# Patient Record
Sex: FEMALE | Race: WHITE | NOT HISPANIC OR LATINO
[De-identification: names, ages, dates, MRNs, and addresses within clinical notes are randomized per-mention and may not be internally consistent; named-entity substitution may affect disease eponyms.]

---

## 2017-02-07 ENCOUNTER — RESULT REVIEW (OUTPATIENT)
Age: 62
End: 2017-02-07

## 2017-05-19 ENCOUNTER — OUTPATIENT (OUTPATIENT)
Dept: OUTPATIENT SERVICES | Facility: HOSPITAL | Age: 62
LOS: 1 days | End: 2017-05-19
Payer: COMMERCIAL

## 2017-05-19 ENCOUNTER — APPOINTMENT (OUTPATIENT)
Dept: MAMMOGRAPHY | Facility: IMAGING CENTER | Age: 62
End: 2017-05-19

## 2017-05-19 ENCOUNTER — APPOINTMENT (OUTPATIENT)
Dept: ULTRASOUND IMAGING | Facility: IMAGING CENTER | Age: 62
End: 2017-05-19

## 2017-05-19 DIAGNOSIS — Z00.8 ENCOUNTER FOR OTHER GENERAL EXAMINATION: ICD-10-CM

## 2017-05-19 PROCEDURE — 76641 ULTRASOUND BREAST COMPLETE: CPT

## 2017-05-19 PROCEDURE — 77063 BREAST TOMOSYNTHESIS BI: CPT

## 2017-05-19 PROCEDURE — 77067 SCR MAMMO BI INCL CAD: CPT

## 2017-08-03 ENCOUNTER — OUTPATIENT (OUTPATIENT)
Dept: OUTPATIENT SERVICES | Facility: HOSPITAL | Age: 62
LOS: 1 days | End: 2017-08-03
Payer: COMMERCIAL

## 2017-08-03 ENCOUNTER — APPOINTMENT (OUTPATIENT)
Age: 62
End: 2017-08-03
Payer: COMMERCIAL

## 2017-08-03 DIAGNOSIS — Z00.8 ENCOUNTER FOR OTHER GENERAL EXAMINATION: ICD-10-CM

## 2017-08-03 PROCEDURE — 70486 CT MAXILLOFACIAL W/O DYE: CPT | Mod: 26

## 2017-08-03 PROCEDURE — 70486 CT MAXILLOFACIAL W/O DYE: CPT

## 2018-02-14 ENCOUNTER — RESULT REVIEW (OUTPATIENT)
Age: 63
End: 2018-02-14

## 2018-06-06 ENCOUNTER — APPOINTMENT (OUTPATIENT)
Dept: RADIOLOGY | Facility: IMAGING CENTER | Age: 63
End: 2018-06-06
Payer: COMMERCIAL

## 2018-06-06 ENCOUNTER — APPOINTMENT (OUTPATIENT)
Dept: MAMMOGRAPHY | Facility: IMAGING CENTER | Age: 63
End: 2018-06-06
Payer: COMMERCIAL

## 2018-06-06 ENCOUNTER — APPOINTMENT (OUTPATIENT)
Dept: ULTRASOUND IMAGING | Facility: IMAGING CENTER | Age: 63
End: 2018-06-06
Payer: COMMERCIAL

## 2018-06-06 ENCOUNTER — OUTPATIENT (OUTPATIENT)
Dept: OUTPATIENT SERVICES | Facility: HOSPITAL | Age: 63
LOS: 1 days | End: 2018-06-06
Payer: COMMERCIAL

## 2018-06-06 DIAGNOSIS — Z00.8 ENCOUNTER FOR OTHER GENERAL EXAMINATION: ICD-10-CM

## 2018-06-06 PROCEDURE — 77063 BREAST TOMOSYNTHESIS BI: CPT

## 2018-06-06 PROCEDURE — 77067 SCR MAMMO BI INCL CAD: CPT | Mod: 26

## 2018-06-06 PROCEDURE — 77063 BREAST TOMOSYNTHESIS BI: CPT | Mod: 26

## 2018-06-06 PROCEDURE — 77067 SCR MAMMO BI INCL CAD: CPT

## 2018-06-06 PROCEDURE — 77080 DXA BONE DENSITY AXIAL: CPT | Mod: 26

## 2018-06-06 PROCEDURE — 76641 ULTRASOUND BREAST COMPLETE: CPT | Mod: 26,50

## 2018-06-06 PROCEDURE — 76641 ULTRASOUND BREAST COMPLETE: CPT

## 2018-06-06 PROCEDURE — 77080 DXA BONE DENSITY AXIAL: CPT

## 2019-02-14 ENCOUNTER — APPOINTMENT (OUTPATIENT)
Dept: OBGYN | Facility: CLINIC | Age: 64
End: 2019-02-14

## 2019-03-18 ENCOUNTER — APPOINTMENT (OUTPATIENT)
Dept: OBGYN | Facility: CLINIC | Age: 64
End: 2019-03-18
Payer: MEDICARE

## 2019-03-18 PROCEDURE — G0101: CPT

## 2019-09-06 ENCOUNTER — OUTPATIENT (OUTPATIENT)
Dept: OUTPATIENT SERVICES | Facility: HOSPITAL | Age: 64
LOS: 1 days | End: 2019-09-06
Payer: MEDICARE

## 2019-09-06 ENCOUNTER — APPOINTMENT (OUTPATIENT)
Dept: ULTRASOUND IMAGING | Facility: IMAGING CENTER | Age: 64
End: 2019-09-06
Payer: MEDICARE

## 2019-09-06 ENCOUNTER — APPOINTMENT (OUTPATIENT)
Dept: MAMMOGRAPHY | Facility: IMAGING CENTER | Age: 64
End: 2019-09-06
Payer: MEDICARE

## 2019-09-06 DIAGNOSIS — Z00.8 ENCOUNTER FOR OTHER GENERAL EXAMINATION: ICD-10-CM

## 2019-09-06 PROCEDURE — 76641 ULTRASOUND BREAST COMPLETE: CPT | Mod: 26,50

## 2019-09-06 PROCEDURE — 77063 BREAST TOMOSYNTHESIS BI: CPT

## 2019-09-06 PROCEDURE — 77063 BREAST TOMOSYNTHESIS BI: CPT | Mod: 26

## 2019-09-06 PROCEDURE — 77067 SCR MAMMO BI INCL CAD: CPT

## 2019-09-06 PROCEDURE — 77067 SCR MAMMO BI INCL CAD: CPT | Mod: 26

## 2019-09-06 PROCEDURE — 76641 ULTRASOUND BREAST COMPLETE: CPT

## 2020-11-24 ENCOUNTER — TRANSCRIPTION ENCOUNTER (OUTPATIENT)
Age: 65
End: 2020-11-24

## 2020-12-22 ENCOUNTER — TRANSCRIPTION ENCOUNTER (OUTPATIENT)
Age: 65
End: 2020-12-22

## 2021-01-04 ENCOUNTER — TRANSCRIPTION ENCOUNTER (OUTPATIENT)
Age: 66
End: 2021-01-04

## 2021-04-12 ENCOUNTER — RESULT REVIEW (OUTPATIENT)
Age: 66
End: 2021-04-12

## 2021-04-12 ENCOUNTER — FORM ENCOUNTER (OUTPATIENT)
Age: 66
End: 2021-04-12

## 2021-04-13 ENCOUNTER — FORM ENCOUNTER (OUTPATIENT)
Age: 66
End: 2021-04-13

## 2021-04-13 ENCOUNTER — APPOINTMENT (OUTPATIENT)
Dept: OBGYN | Facility: CLINIC | Age: 66
End: 2021-04-13
Payer: MEDICARE

## 2021-04-13 PROCEDURE — G0101: CPT

## 2021-04-15 ENCOUNTER — FORM ENCOUNTER (OUTPATIENT)
Age: 66
End: 2021-04-15

## 2021-04-29 ENCOUNTER — TRANSCRIPTION ENCOUNTER (OUTPATIENT)
Age: 66
End: 2021-04-29

## 2021-04-30 ENCOUNTER — FORM ENCOUNTER (OUTPATIENT)
Age: 66
End: 2021-04-30

## 2021-05-10 ENCOUNTER — FORM ENCOUNTER (OUTPATIENT)
Age: 66
End: 2021-05-10

## 2021-05-11 ENCOUNTER — RESULT REVIEW (OUTPATIENT)
Age: 66
End: 2021-05-11

## 2021-05-11 ENCOUNTER — OUTPATIENT (OUTPATIENT)
Dept: OUTPATIENT SERVICES | Facility: HOSPITAL | Age: 66
LOS: 1 days | End: 2021-05-11
Payer: MEDICARE

## 2021-05-11 ENCOUNTER — APPOINTMENT (OUTPATIENT)
Dept: RADIOLOGY | Facility: IMAGING CENTER | Age: 66
End: 2021-05-11
Payer: MEDICARE

## 2021-05-11 ENCOUNTER — FORM ENCOUNTER (OUTPATIENT)
Age: 66
End: 2021-05-11

## 2021-05-11 ENCOUNTER — APPOINTMENT (OUTPATIENT)
Dept: ULTRASOUND IMAGING | Facility: IMAGING CENTER | Age: 66
End: 2021-05-11
Payer: MEDICARE

## 2021-05-11 ENCOUNTER — APPOINTMENT (OUTPATIENT)
Dept: MAMMOGRAPHY | Facility: IMAGING CENTER | Age: 66
End: 2021-05-11
Payer: MEDICARE

## 2021-05-11 DIAGNOSIS — Z00.8 ENCOUNTER FOR OTHER GENERAL EXAMINATION: ICD-10-CM

## 2021-05-11 PROCEDURE — 77080 DXA BONE DENSITY AXIAL: CPT | Mod: 26

## 2021-05-11 PROCEDURE — 77063 BREAST TOMOSYNTHESIS BI: CPT | Mod: 26

## 2021-05-11 PROCEDURE — 76641 ULTRASOUND BREAST COMPLETE: CPT | Mod: 26,50

## 2021-05-11 PROCEDURE — 77067 SCR MAMMO BI INCL CAD: CPT | Mod: 26

## 2021-05-11 PROCEDURE — 76641 ULTRASOUND BREAST COMPLETE: CPT

## 2021-05-11 PROCEDURE — 77067 SCR MAMMO BI INCL CAD: CPT

## 2021-05-11 PROCEDURE — 77063 BREAST TOMOSYNTHESIS BI: CPT

## 2021-05-11 PROCEDURE — 77080 DXA BONE DENSITY AXIAL: CPT

## 2021-08-08 ENCOUNTER — TRANSCRIPTION ENCOUNTER (OUTPATIENT)
Age: 66
End: 2021-08-08

## 2021-08-10 ENCOUNTER — TRANSCRIPTION ENCOUNTER (OUTPATIENT)
Age: 66
End: 2021-08-10

## 2021-10-13 ENCOUNTER — FORM ENCOUNTER (OUTPATIENT)
Age: 66
End: 2021-10-13

## 2021-10-16 ENCOUNTER — FORM ENCOUNTER (OUTPATIENT)
Age: 66
End: 2021-10-16

## 2021-11-01 ENCOUNTER — TRANSCRIPTION ENCOUNTER (OUTPATIENT)
Age: 66
End: 2021-11-01

## 2021-12-01 ENCOUNTER — TRANSCRIPTION ENCOUNTER (OUTPATIENT)
Age: 66
End: 2021-12-01

## 2021-12-30 ENCOUNTER — TRANSCRIPTION ENCOUNTER (OUTPATIENT)
Age: 66
End: 2021-12-30

## 2021-12-31 ENCOUNTER — TRANSCRIPTION ENCOUNTER (OUTPATIENT)
Age: 66
End: 2021-12-31

## 2022-04-20 ENCOUNTER — RX RENEWAL (OUTPATIENT)
Age: 67
End: 2022-04-20

## 2022-04-20 DIAGNOSIS — Z79.890 HORMONE REPLACEMENT THERAPY: ICD-10-CM

## 2022-04-20 DIAGNOSIS — B37.9 CANDIDIASIS, UNSPECIFIED: ICD-10-CM

## 2022-04-22 ENCOUNTER — NON-APPOINTMENT (OUTPATIENT)
Age: 67
End: 2022-04-22

## 2022-04-22 DIAGNOSIS — E78.00 PURE HYPERCHOLESTEROLEMIA, UNSPECIFIED: ICD-10-CM

## 2022-04-22 DIAGNOSIS — Z98.890 OTHER SPECIFIED POSTPROCEDURAL STATES: ICD-10-CM

## 2022-04-22 DIAGNOSIS — Z83.3 FAMILY HISTORY OF DIABETES MELLITUS: ICD-10-CM

## 2022-04-22 DIAGNOSIS — Z78.9 OTHER SPECIFIED HEALTH STATUS: ICD-10-CM

## 2022-04-22 RX ORDER — ASCORBIC ACID 500 MG
TABLET ORAL
Refills: 0 | Status: ACTIVE | COMMUNITY

## 2022-05-13 ENCOUNTER — APPOINTMENT (OUTPATIENT)
Dept: OBGYN | Facility: CLINIC | Age: 67
End: 2022-05-13

## 2022-05-20 ENCOUNTER — NON-APPOINTMENT (OUTPATIENT)
Age: 67
End: 2022-05-20

## 2022-06-14 ENCOUNTER — RX RENEWAL (OUTPATIENT)
Age: 67
End: 2022-06-14

## 2022-06-16 ENCOUNTER — RX RENEWAL (OUTPATIENT)
Age: 67
End: 2022-06-16

## 2022-11-22 ENCOUNTER — RX RENEWAL (OUTPATIENT)
Age: 67
End: 2022-11-22

## 2022-11-23 ENCOUNTER — RX RENEWAL (OUTPATIENT)
Age: 67
End: 2022-11-23

## 2023-01-23 ENCOUNTER — OFFICE (OUTPATIENT)
Dept: URBAN - METROPOLITAN AREA CLINIC 12 | Facility: CLINIC | Age: 68
Setting detail: OPHTHALMOLOGY
End: 2023-01-23
Payer: MEDICARE

## 2023-01-23 DIAGNOSIS — H01.001: ICD-10-CM

## 2023-01-23 DIAGNOSIS — H43.393: ICD-10-CM

## 2023-01-23 DIAGNOSIS — H16.223: ICD-10-CM

## 2023-01-23 DIAGNOSIS — H01.004: ICD-10-CM

## 2023-01-23 DIAGNOSIS — Z96.1: ICD-10-CM

## 2023-01-23 DIAGNOSIS — H35.413: ICD-10-CM

## 2023-01-23 PROBLEM — H35.40 PERIPAPILLARY ATROPHY: Status: ACTIVE | Noted: 2023-01-23

## 2023-01-23 PROCEDURE — 92014 COMPRE OPH EXAM EST PT 1/>: CPT | Performed by: OPHTHALMOLOGY

## 2023-01-23 PROCEDURE — 92134 CPTRZ OPH DX IMG PST SGM RTA: CPT | Performed by: OPHTHALMOLOGY

## 2023-01-23 ASSESSMENT — KERATOMETRY
OD_K2POWER_DIOPTERS: 43.25
OD_AXISANGLE_DEGREES: 156
OS_K2POWER_DIOPTERS: 43.00
OD_K1POWER_DIOPTERS: 42.50
OS_K1POWER_DIOPTERS: 42.00
OS_AXISANGLE_DEGREES: 178

## 2023-01-23 ASSESSMENT — REFRACTION_CURRENTRX
OD_OVR_VA: 20/
OD_VPRISM_DIRECTION: SV
OS_VPRISM_DIRECTION: SV
OS_OVR_VA: 20/
OD_SPHERE: +2.00
OS_SPHERE: +2.00

## 2023-01-23 ASSESSMENT — AXIALLENGTH_DERIVED
OS_AL: 24.1677
OD_AL: 24.1255
OS_AL: 24.1677
OD_AL: 24.1255

## 2023-01-23 ASSESSMENT — SUPERFICIAL PUNCTATE KERATITIS (SPK)
OS_SPK: 1+
OD_SPK: 1+

## 2023-01-23 ASSESSMENT — REFRACTION_MANIFEST
OS_SPHERE: +0.25
OS_VA1: 20/20-
OD_CYLINDER: -1.00
OS_CYLINDER: -1.50
OU_VA: 20/25
OD_SPHERE: -0.25
OD_AXIS: 080
OD_VA1: 20/20-2
OS_AXIS: 086

## 2023-01-23 ASSESSMENT — VISUAL ACUITY
OD_BCVA: 20/25-2
OS_BCVA: 20/60

## 2023-01-23 ASSESSMENT — REFRACTION_AUTOREFRACTION
OD_CYLINDER: -1.00
OD_AXIS: 080
OD_SPHERE: -0.25
OS_CYLINDER: -1.50
OS_AXIS: 086
OS_SPHERE: +0.25

## 2023-01-23 ASSESSMENT — SPHEQUIV_DERIVED
OD_SPHEQUIV: -0.75
OS_SPHEQUIV: -0.5
OD_SPHEQUIV: -0.75
OS_SPHEQUIV: -0.5

## 2023-01-23 ASSESSMENT — LID EXAM ASSESSMENTS
OD_BLEPHARITIS: RLL 1+
OS_BLEPHARITIS: LLL 1+

## 2023-01-23 ASSESSMENT — CONFRONTATIONAL VISUAL FIELD TEST (CVF)
OD_FINDINGS: FULL
OS_FINDINGS: FULL

## 2023-01-23 ASSESSMENT — TONOMETRY
OD_IOP_MMHG: 18
OS_IOP_MMHG: 17

## 2023-04-16 ENCOUNTER — NON-APPOINTMENT (OUTPATIENT)
Age: 68
End: 2023-04-16

## 2023-04-21 ENCOUNTER — APPOINTMENT (OUTPATIENT)
Dept: OBGYN | Facility: CLINIC | Age: 68
End: 2023-04-21
Payer: MEDICARE

## 2023-04-21 VITALS
HEIGHT: 66 IN | DIASTOLIC BLOOD PRESSURE: 83 MMHG | SYSTOLIC BLOOD PRESSURE: 123 MMHG | BODY MASS INDEX: 23.14 KG/M2 | WEIGHT: 144 LBS

## 2023-04-21 DIAGNOSIS — Z01.419 ENCOUNTER FOR GYNECOLOGICAL EXAMINATION (GENERAL) (ROUTINE) W/OUT ABNORMAL FINDINGS: ICD-10-CM

## 2023-04-21 PROCEDURE — G0101: CPT

## 2023-04-21 RX ORDER — NITROFURANTOIN (MONOHYDRATE/MACROCRYSTALS) 25; 75 MG/1; MG/1
100 CAPSULE ORAL
Qty: 20 | Refills: 0 | Status: ACTIVE | COMMUNITY
Start: 2022-11-22 | End: 1900-01-01

## 2023-04-21 RX ORDER — FLUCONAZOLE 150 MG/1
150 TABLET ORAL
Qty: 2 | Refills: 2 | Status: ACTIVE | COMMUNITY
Start: 2022-04-20 | End: 1900-01-01

## 2023-04-21 NOTE — PHYSICAL EXAM
[Alert] : alert [Appropriately responsive] : appropriately responsive [No Acute Distress] : no acute distress [No Lymphadenopathy] : no lymphadenopathy [Soft] : soft [Non-tender] : non-tender [Non-distended] : non-distended [No HSM] : No HSM [No Lesions] : no lesions [No Mass] : no mass [Oriented x3] : oriented x3 [Examination Of The Breasts] : a normal appearance [No Masses] : no breast masses were palpable [Labia Majora] : normal [Labia Minora] : normal [Normal] : normal [Uterine Adnexae] : normal

## 2023-04-21 NOTE — HISTORY OF PRESENT ILLNESS
[Patient reported mammogram was normal] : Patient reported mammogram was normal [Patient reported breast sonogram was normal] : Patient reported breast sonogram was normal [Patient reported PAP Smear was normal] : Patient reported PAP Smear was normal [Patient reported bone density results were normal] : Patient reported bone density results were normal [FreeTextEntry1] : 66 yo female pt  present for an annual wellness visit. Pt is still taking Premarin. The pt is well and has no complaints. \par \par PMHx: hypothyroid\par PSHx: transilluminated phlebectomy\par OBHx:  x4\par  [Mammogramdate] : 05/21 [TextBox_19] : Birads 1 [BreastSonogramDate] : 05/21 [PapSmeardate] : 04/21 [BoneDensityDate] : 05/21

## 2023-04-21 NOTE — PLAN
[FreeTextEntry1] : 68 yo female pt present for an annual wellness visit\par \par HCM\par -pap done today \par -rx for breast mammo/ sono\par -rx for bone density\par -rx Premarin, Diflucan, Macrobid\par -f/u PCP for annual and appropriate immunizations\par -rto 2 year

## 2023-04-24 DIAGNOSIS — R39.9 UNSPECIFIED SYMPTOMS AND SIGNS INVOLVING THE GENITOURINARY SYSTEM: ICD-10-CM

## 2023-04-24 LAB — HPV HIGH+LOW RISK DNA PNL CVX: NOT DETECTED

## 2023-04-24 RX ORDER — NITROFURANTOIN (MONOHYDRATE/MACROCRYSTALS) 25; 75 MG/1; MG/1
100 CAPSULE ORAL
Qty: 20 | Refills: 0 | Status: ACTIVE | COMMUNITY
Start: 2023-04-24 | End: 1900-01-01

## 2023-04-28 LAB — CYTOLOGY CVX/VAG DOC THIN PREP: NORMAL

## 2023-05-08 ENCOUNTER — RESULT REVIEW (OUTPATIENT)
Age: 68
End: 2023-05-08

## 2023-05-08 ENCOUNTER — APPOINTMENT (OUTPATIENT)
Dept: MAMMOGRAPHY | Facility: CLINIC | Age: 68
End: 2023-05-08
Payer: MEDICARE

## 2023-05-08 ENCOUNTER — APPOINTMENT (OUTPATIENT)
Dept: ULTRASOUND IMAGING | Facility: CLINIC | Age: 68
End: 2023-05-08
Payer: MEDICARE

## 2023-05-08 ENCOUNTER — APPOINTMENT (OUTPATIENT)
Dept: RADIOLOGY | Facility: CLINIC | Age: 68
End: 2023-05-08
Payer: MEDICARE

## 2023-05-08 ENCOUNTER — OUTPATIENT (OUTPATIENT)
Dept: OUTPATIENT SERVICES | Facility: HOSPITAL | Age: 68
LOS: 1 days | End: 2023-05-08
Payer: MEDICARE

## 2023-05-08 DIAGNOSIS — Z01.419 ENCOUNTER FOR GYNECOLOGICAL EXAMINATION (GENERAL) (ROUTINE) WITHOUT ABNORMAL FINDINGS: ICD-10-CM

## 2023-05-08 PROCEDURE — 76641 ULTRASOUND BREAST COMPLETE: CPT | Mod: 26,50,GZ

## 2023-05-08 PROCEDURE — 77067 SCR MAMMO BI INCL CAD: CPT | Mod: 26

## 2023-05-08 PROCEDURE — 76641 ULTRASOUND BREAST COMPLETE: CPT

## 2023-05-08 PROCEDURE — 77063 BREAST TOMOSYNTHESIS BI: CPT

## 2023-05-08 PROCEDURE — 77080 DXA BONE DENSITY AXIAL: CPT

## 2023-05-08 PROCEDURE — 77067 SCR MAMMO BI INCL CAD: CPT

## 2023-05-08 PROCEDURE — 77080 DXA BONE DENSITY AXIAL: CPT | Mod: 26

## 2023-05-08 PROCEDURE — 77063 BREAST TOMOSYNTHESIS BI: CPT | Mod: 26

## 2024-01-01 ENCOUNTER — NON-APPOINTMENT (OUTPATIENT)
Age: 69
End: 2024-01-01

## 2024-01-11 ENCOUNTER — NON-APPOINTMENT (OUTPATIENT)
Age: 69
End: 2024-01-11

## 2024-01-25 ENCOUNTER — NON-APPOINTMENT (OUTPATIENT)
Age: 69
End: 2024-01-25

## 2024-05-20 ENCOUNTER — RX RENEWAL (OUTPATIENT)
Age: 69
End: 2024-05-20

## 2024-05-23 ENCOUNTER — OFFICE (OUTPATIENT)
Dept: URBAN - METROPOLITAN AREA CLINIC 12 | Facility: CLINIC | Age: 69
Setting detail: OPHTHALMOLOGY
End: 2024-05-23
Payer: MEDICARE

## 2024-05-23 DIAGNOSIS — H35.40: ICD-10-CM

## 2024-05-23 DIAGNOSIS — H16.223: ICD-10-CM

## 2024-05-23 DIAGNOSIS — H43.393: ICD-10-CM

## 2024-05-23 DIAGNOSIS — H44.23: ICD-10-CM

## 2024-05-23 DIAGNOSIS — Z96.1: ICD-10-CM

## 2024-05-23 DIAGNOSIS — H01.001: ICD-10-CM

## 2024-05-23 DIAGNOSIS — H01.004: ICD-10-CM

## 2024-05-23 DIAGNOSIS — H35.413: ICD-10-CM

## 2024-05-23 PROCEDURE — 92250 FUNDUS PHOTOGRAPHY W/I&R: CPT | Performed by: OPHTHALMOLOGY

## 2024-05-23 PROCEDURE — 92014 COMPRE OPH EXAM EST PT 1/>: CPT | Performed by: OPHTHALMOLOGY

## 2024-05-23 ASSESSMENT — LID EXAM ASSESSMENTS
OD_BLEPHARITIS: RLL 1+
OS_BLEPHARITIS: LLL 1+

## 2024-05-23 ASSESSMENT — CONFRONTATIONAL VISUAL FIELD TEST (CVF)
OS_FINDINGS: FULL
OD_FINDINGS: FULL

## 2024-06-17 RX ORDER — CONJUGATED ESTROGENS 0.62 MG/G
0.62 CREAM VAGINAL
Qty: 1 | Refills: 1 | Status: ACTIVE | COMMUNITY
Start: 2022-04-20 | End: 1900-01-01

## 2024-06-18 ENCOUNTER — APPOINTMENT (OUTPATIENT)
Dept: OBGYN | Facility: CLINIC | Age: 69
End: 2024-06-18

## 2024-08-23 ENCOUNTER — INPATIENT (INPATIENT)
Facility: HOSPITAL | Age: 69
LOS: 2 days | Discharge: HOME | DRG: 387 | End: 2024-08-26
Attending: STUDENT IN AN ORGANIZED HEALTH CARE EDUCATION/TRAINING PROGRAM | Admitting: INTERNAL MEDICINE
Payer: MEDICARE

## 2024-08-23 VITALS
SYSTOLIC BLOOD PRESSURE: 116 MMHG | HEART RATE: 90 BPM | DIASTOLIC BLOOD PRESSURE: 62 MMHG | WEIGHT: 139.99 LBS | RESPIRATION RATE: 18 BRPM | TEMPERATURE: 101 F | HEIGHT: 66 IN | OXYGEN SATURATION: 97 %

## 2024-08-23 PROCEDURE — 93010 ELECTROCARDIOGRAM REPORT: CPT

## 2024-08-23 PROCEDURE — 99285 EMERGENCY DEPT VISIT HI MDM: CPT

## 2024-08-23 NOTE — ED ADULT TRIAGE NOTE - CHIEF COMPLAINT QUOTE
Pt presents from home complaining of N/V/D and fevers x3 days. Unable to tolerate PO. States she traveled to [facility] and returned on 8/20. Since return she has been feeling unwell. Pt appears pale and lethargic but remains awake and alert. Hx Hypotension and borderline DM

## 2024-08-24 DIAGNOSIS — K51.00 ULCERATIVE (CHRONIC) PANCOLITIS WITHOUT COMPLICATIONS: ICD-10-CM

## 2024-08-24 LAB
ABO RH CONFIRMATION: SIGNIFICANT CHANGE UP
ALBUMIN SERPL ELPH-MCNC: 3 G/DL — LOW (ref 3.3–5)
ALP SERPL-CCNC: 44 U/L — SIGNIFICANT CHANGE UP (ref 40–120)
ALT FLD-CCNC: 26 U/L — SIGNIFICANT CHANGE UP (ref 12–78)
ANION GAP SERPL CALC-SCNC: 8 MMOL/L — SIGNIFICANT CHANGE UP (ref 5–17)
APPEARANCE UR: ABNORMAL
APTT BLD: 26.3 SEC — SIGNIFICANT CHANGE UP (ref 24.5–35.6)
AST SERPL-CCNC: 22 U/L — SIGNIFICANT CHANGE UP (ref 15–37)
BACTERIA # UR AUTO: ABNORMAL /HPF
BASOPHILS # BLD AUTO: 0 K/UL — SIGNIFICANT CHANGE UP (ref 0–0.2)
BASOPHILS NFR BLD AUTO: 0 % — SIGNIFICANT CHANGE UP (ref 0–2)
BILIRUB SERPL-MCNC: 1.1 MG/DL — SIGNIFICANT CHANGE UP (ref 0.2–1.2)
BILIRUB UR-MCNC: NEGATIVE — SIGNIFICANT CHANGE UP
BLD GP AB SCN SERPL QL: SIGNIFICANT CHANGE UP
BUN SERPL-MCNC: 15 MG/DL — SIGNIFICANT CHANGE UP (ref 7–23)
C DIFF GDH STL QL: NEGATIVE — SIGNIFICANT CHANGE UP
C DIFF GDH STL QL: SIGNIFICANT CHANGE UP
CALCIUM SERPL-MCNC: 8.3 MG/DL — LOW (ref 8.5–10.1)
CAMPYLOBACTER DNA SPEC NAA+PROBE: DETECTED
CAST: 10 /LPF — HIGH (ref 0–4)
CHLORIDE SERPL-SCNC: 101 MMOL/L — SIGNIFICANT CHANGE UP (ref 96–108)
CO2 SERPL-SCNC: 23 MMOL/L — SIGNIFICANT CHANGE UP (ref 22–31)
COLOR SPEC: SIGNIFICANT CHANGE UP
CREAT SERPL-MCNC: 1.17 MG/DL — SIGNIFICANT CHANGE UP (ref 0.5–1.3)
DIFF PNL FLD: ABNORMAL
EGFR: 50 ML/MIN/1.73M2 — LOW
EOSINOPHIL # BLD AUTO: 0 K/UL — SIGNIFICANT CHANGE UP (ref 0–0.5)
EOSINOPHIL NFR BLD AUTO: 0 % — SIGNIFICANT CHANGE UP (ref 0–6)
FLUAV AG NPH QL: SIGNIFICANT CHANGE UP
FLUBV AG NPH QL: SIGNIFICANT CHANGE UP
GI PCR PANEL: DETECTED
GLUCOSE SERPL-MCNC: 175 MG/DL — HIGH (ref 70–99)
GLUCOSE UR QL: 250 MG/DL
GRAN CASTS # UR COMP ASSIST: PRESENT
HCT VFR BLD CALC: 37.8 % — SIGNIFICANT CHANGE UP (ref 34.5–45)
HGB BLD-MCNC: 13.1 G/DL — SIGNIFICANT CHANGE UP (ref 11.5–15.5)
INR BLD: 1.07 RATIO — SIGNIFICANT CHANGE UP (ref 0.85–1.18)
KETONES UR-MCNC: ABNORMAL MG/DL
LEUKOCYTE ESTERASE UR-ACNC: NEGATIVE — SIGNIFICANT CHANGE UP
LYMPHOCYTES # BLD AUTO: 0.41 K/UL — LOW (ref 1–3.3)
LYMPHOCYTES # BLD AUTO: 13 % — SIGNIFICANT CHANGE UP (ref 13–44)
MANUAL SMEAR VERIFICATION: SIGNIFICANT CHANGE UP
MCHC RBC-ENTMCNC: 32.8 PG — SIGNIFICANT CHANGE UP (ref 27–34)
MCHC RBC-ENTMCNC: 34.7 GM/DL — SIGNIFICANT CHANGE UP (ref 32–36)
MCV RBC AUTO: 94.7 FL — SIGNIFICANT CHANGE UP (ref 80–100)
MONOCYTES # BLD AUTO: 0.73 K/UL — SIGNIFICANT CHANGE UP (ref 0–0.9)
MONOCYTES NFR BLD AUTO: 23 % — HIGH (ref 2–14)
NEUTROPHILS # BLD AUTO: 2.04 K/UL — SIGNIFICANT CHANGE UP (ref 1.8–7.4)
NEUTROPHILS NFR BLD AUTO: 40.5 % — LOW (ref 43–77)
NEUTS BAND # BLD: 23.5 % — HIGH (ref 0–8)
NITRITE UR-MCNC: NEGATIVE — SIGNIFICANT CHANGE UP
NRBC # BLD: 0 /100 WBCS — SIGNIFICANT CHANGE UP (ref 0–0)
NRBC # BLD: SIGNIFICANT CHANGE UP /100 WBCS (ref 0–0)
PH UR: 5.5 — SIGNIFICANT CHANGE UP (ref 5–8)
PLAT MORPH BLD: NORMAL — SIGNIFICANT CHANGE UP
PLATELET # BLD AUTO: 164 K/UL — SIGNIFICANT CHANGE UP (ref 150–400)
POTASSIUM SERPL-MCNC: 3.7 MMOL/L — SIGNIFICANT CHANGE UP (ref 3.5–5.3)
POTASSIUM SERPL-SCNC: 3.7 MMOL/L — SIGNIFICANT CHANGE UP (ref 3.5–5.3)
PROT SERPL-MCNC: 6.5 GM/DL — SIGNIFICANT CHANGE UP (ref 6–8.3)
PROT UR-MCNC: 100 MG/DL
PROTHROM AB SERPL-ACNC: 12.1 SEC — SIGNIFICANT CHANGE UP (ref 9.5–13)
RBC # BLD: 3.99 M/UL — SIGNIFICANT CHANGE UP (ref 3.8–5.2)
RBC # FLD: 14.4 % — SIGNIFICANT CHANGE UP (ref 10.3–14.5)
RBC BLD AUTO: ABNORMAL
RBC CASTS # UR COMP ASSIST: 6 /HPF — HIGH (ref 0–4)
RSV RNA NPH QL NAA+NON-PROBE: SIGNIFICANT CHANGE UP
SARS-COV-2 RNA SPEC QL NAA+PROBE: SIGNIFICANT CHANGE UP
SODIUM SERPL-SCNC: 132 MMOL/L — LOW (ref 135–145)
SP GR SPEC: 1.03 — SIGNIFICANT CHANGE UP (ref 1–1.03)
SQUAMOUS # UR AUTO: 24 /HPF — HIGH (ref 0–5)
STOMATOCYTES BLD QL SMEAR: SIGNIFICANT CHANGE UP
UROBILINOGEN FLD QL: 1 MG/DL — SIGNIFICANT CHANGE UP (ref 0.2–1)
WBC # BLD: 3.18 K/UL — LOW (ref 3.8–10.5)
WBC # FLD AUTO: 3.18 K/UL — LOW (ref 3.8–10.5)
WBC UR QL: 11 /HPF — HIGH (ref 0–5)

## 2024-08-24 PROCEDURE — 84100 ASSAY OF PHOSPHORUS: CPT

## 2024-08-24 PROCEDURE — 74177 CT ABD & PELVIS W/CONTRAST: CPT | Mod: 26,MC

## 2024-08-24 PROCEDURE — 83735 ASSAY OF MAGNESIUM: CPT

## 2024-08-24 PROCEDURE — 80048 BASIC METABOLIC PNL TOTAL CA: CPT

## 2024-08-24 PROCEDURE — 99223 1ST HOSP IP/OBS HIGH 75: CPT

## 2024-08-24 PROCEDURE — 85025 COMPLETE CBC W/AUTO DIFF WBC: CPT

## 2024-08-24 PROCEDURE — 36415 COLL VENOUS BLD VENIPUNCTURE: CPT

## 2024-08-24 PROCEDURE — 85027 COMPLETE CBC AUTOMATED: CPT

## 2024-08-24 RX ORDER — ROSUVASTATIN CALCIUM 10 MG/1
10 TABLET ORAL AT BEDTIME
Refills: 0 | Status: DISCONTINUED | OUTPATIENT
Start: 2024-08-24 | End: 2024-08-26

## 2024-08-24 RX ORDER — SODIUM CHLORIDE 9 MG/ML
1000 INJECTION INTRAMUSCULAR; INTRAVENOUS; SUBCUTANEOUS ONCE
Refills: 0 | Status: COMPLETED | OUTPATIENT
Start: 2024-08-24 | End: 2024-08-24

## 2024-08-24 RX ORDER — ONDANSETRON 2 MG/ML
4 INJECTION, SOLUTION INTRAMUSCULAR; INTRAVENOUS EVERY 8 HOURS
Refills: 0 | Status: DISCONTINUED | OUTPATIENT
Start: 2024-08-24 | End: 2024-08-26

## 2024-08-24 RX ORDER — METRONIDAZOLE 250 MG
500 TABLET ORAL ONCE
Refills: 0 | Status: COMPLETED | OUTPATIENT
Start: 2024-08-24 | End: 2024-08-24

## 2024-08-24 RX ORDER — FOLIC ACID/MULTIVIT,IRON,MINER 0.4MG-18MG
1 TABLET,CHEWABLE ORAL
Refills: 0 | DISCHARGE

## 2024-08-24 RX ORDER — AZITHROMYCIN 500 MG/1
500 TABLET, FILM COATED ORAL ONCE
Refills: 0 | Status: COMPLETED | OUTPATIENT
Start: 2024-08-24 | End: 2024-08-24

## 2024-08-24 RX ORDER — ACETAMINOPHEN 325 MG/1
1000 TABLET ORAL ONCE
Refills: 0 | Status: COMPLETED | OUTPATIENT
Start: 2024-08-24 | End: 2024-08-24

## 2024-08-24 RX ORDER — MAGNESIUM, ALUMINUM HYDROXIDE 200-225/5
30 SUSPENSION, ORAL (FINAL DOSE FORM) ORAL EVERY 4 HOURS
Refills: 0 | Status: DISCONTINUED | OUTPATIENT
Start: 2024-08-24 | End: 2024-08-26

## 2024-08-24 RX ORDER — ESTROGENS, CONJUGATED 0.625 MG/G
1 CREAM WITH APPLICATOR VAGINAL
Refills: 0 | DISCHARGE

## 2024-08-24 RX ORDER — ONDANSETRON 2 MG/ML
4 INJECTION, SOLUTION INTRAMUSCULAR; INTRAVENOUS ONCE
Refills: 0 | Status: COMPLETED | OUTPATIENT
Start: 2024-08-24 | End: 2024-08-24

## 2024-08-24 RX ORDER — CYANOCOBALAMIN (VITAMIN B-12) 500MCG/0.1
1 GEL (ML) NASAL
Refills: 0 | DISCHARGE

## 2024-08-24 RX ORDER — AZITHROMYCIN 500 MG/1
500 TABLET, FILM COATED ORAL EVERY 24 HOURS
Refills: 0 | Status: DISCONTINUED | OUTPATIENT
Start: 2024-08-25 | End: 2024-08-26

## 2024-08-24 RX ORDER — CYCLOSPORINE 0.05 %
1 DROPPERETTE, SINGLE-USE DROP DISPENSER OPHTHALMIC (EYE)
Refills: 0 | DISCHARGE

## 2024-08-24 RX ORDER — ACETAMINOPHEN 325 MG/1
650 TABLET ORAL EVERY 6 HOURS
Refills: 0 | Status: DISCONTINUED | OUTPATIENT
Start: 2024-08-24 | End: 2024-08-26

## 2024-08-24 RX ORDER — SODIUM CHLORIDE 9 MG/ML
1000 INJECTION INTRAMUSCULAR; INTRAVENOUS; SUBCUTANEOUS
Refills: 0 | Status: DISCONTINUED | OUTPATIENT
Start: 2024-08-24 | End: 2024-08-26

## 2024-08-24 RX ORDER — AZITHROMYCIN 500 MG/1
TABLET, FILM COATED ORAL
Refills: 0 | Status: DISCONTINUED | OUTPATIENT
Start: 2024-08-24 | End: 2024-08-26

## 2024-08-24 RX ADMIN — ONDANSETRON 4 MILLIGRAM(S): 2 INJECTION, SOLUTION INTRAMUSCULAR; INTRAVENOUS at 17:08

## 2024-08-24 RX ADMIN — ONDANSETRON 4 MILLIGRAM(S): 2 INJECTION, SOLUTION INTRAMUSCULAR; INTRAVENOUS at 00:41

## 2024-08-24 RX ADMIN — SODIUM CHLORIDE 1000 MILLILITER(S): 9 INJECTION INTRAMUSCULAR; INTRAVENOUS; SUBCUTANEOUS at 00:41

## 2024-08-24 RX ADMIN — SODIUM CHLORIDE 100 MILLILITER(S): 9 INJECTION INTRAMUSCULAR; INTRAVENOUS; SUBCUTANEOUS at 09:37

## 2024-08-24 RX ADMIN — Medication 100 MILLIGRAM(S): at 04:55

## 2024-08-24 RX ADMIN — SODIUM CHLORIDE 1000 MILLILITER(S): 9 INJECTION INTRAMUSCULAR; INTRAVENOUS; SUBCUTANEOUS at 04:55

## 2024-08-24 RX ADMIN — Medication 200 MILLIGRAM(S): at 09:37

## 2024-08-24 RX ADMIN — ROSUVASTATIN CALCIUM 10 MILLIGRAM(S): 10 TABLET ORAL at 20:59

## 2024-08-24 RX ADMIN — ACETAMINOPHEN 400 MILLIGRAM(S): 325 TABLET ORAL at 01:51

## 2024-08-24 RX ADMIN — AZITHROMYCIN 255 MILLIGRAM(S): 500 TABLET, FILM COATED ORAL at 14:55

## 2024-08-24 RX ADMIN — ONDANSETRON 4 MILLIGRAM(S): 2 INJECTION, SOLUTION INTRAMUSCULAR; INTRAVENOUS at 09:44

## 2024-08-24 NOTE — H&P ADULT - NSHPLABSRESULTS_GEN_ALL_CORE
LABS: All Labs Reviewed:                        13.1   3.18  )-----------( 164      ( 24 Aug 2024 00:29 )             37.8     08-24    132<L>  |  101  |  15  ----------------------------<  175<H>  3.7   |  23  |  1.17    Ca    8.3<L>      24 Aug 2024 00:29    TPro  6.5  /  Alb  3.0<L>  /  TBili  1.1  /  DBili  x   /  AST  22  /  ALT  26  /  AlkPhos  44  08-24    PT/INR - ( 24 Aug 2024 00:29 )   PT: 12.1 sec;   INR: 1.07 ratio         PTT - ( 24 Aug 2024 00:29 )  PTT:26.3 sec          Blood Culture:       < from: CT Abdomen and Pelvis w/ IV Cont (08.24.24 @ 05:28) >      IMPRESSION:  Pan proctocolitis.    < end of copied text >

## 2024-08-24 NOTE — PHARMACOTHERAPY INTERVENTION NOTE - COMMENTS
Medication reconciliation completed.  Reviewed Medication list and confirmed med allergies with patient; confirmed with Dr. First Medmaryellen.

## 2024-08-24 NOTE — ED PROVIDER NOTE - OBJECTIVE STATEMENT
pt is a 70 yo wf with no pmh who returned from travelling from [facility] on Tuesday . on Wed pt noted n/v/d and fever. This continued until Thursday where diarrhea got much worse uncontrollable. pt is a 68 yo wf with no pmh who returned from travelling from [facility] on Tuesday . on Wed pt noted n/v/d and fever. This continued until Thursday where diarrhea got much worse uncontrollable .Nonbloody. Has some cramping but no localized pain. No recent antibiotics. No sick contacts. Here with . Nonsmoker

## 2024-08-24 NOTE — ED ADULT NURSE REASSESSMENT NOTE - NS ED NURSE REASSESS COMMENT FT1
Pt received A+Ox4. VSS. Pt had large liquid stool. Also c/o nausea. Zofran given with good results. IV infusing @ 100ml/h. Pt instructed in plan of care. Monitored closely. Call bell in reach.

## 2024-08-24 NOTE — H&P ADULT - NSHPPHYSICALEXAM_GEN_ALL_CORE
ICU Vital Signs Last 24 Hrs  T(C): 36.9 (24 Aug 2024 04:58), Max: 38.9 (24 Aug 2024 01:35)  T(F): 98.5 (24 Aug 2024 04:58), Max: 102 (24 Aug 2024 01:35)  HR: 74 (24 Aug 2024 08:07) (69 - 93)  BP: 109/57 (24 Aug 2024 08:07) (101/62 - 119/86)  BP(mean): 73 (24 Aug 2024 08:07) (73 - 75)  ABP: --  ABP(mean): --  RR: 17 (24 Aug 2024 08:07) (16 - 19)  SpO2: 97% (24 Aug 2024 08:07) (97% - 99%)    O2 Parameters below as of 24 Aug 2024 08:07  Patient On (Oxygen Delivery Method): room air            PHYSICAL EXAM:    Constitutional: NAD, awake and alert  HEENT: PERR, EOMI, Normal Hearing, MMM  Neck: Soft and supple, No LAD, No JVD  Respiratory: Breath sounds are clear bilaterally, No wheezing, rales or rhonchi  Cardiovascular: S1 and S2, regular rate and rhythm, no Murmurs, gallops or rubs  Gastrointestinal: Bowel Sounds present, soft, nontender, nondistended, no guarding, no rebound  Extremities: No peripheral edema  Vascular: 2+ peripheral pulses  Neurological: A/O x 3, no focal deficits  Musculoskeletal: 5/5 strength b/l upper and lower extremities  Skin: No rashes

## 2024-08-24 NOTE — ED ADULT NURSE NOTE - OBJECTIVE STATEMENT
69y female presented to the ED with complaints of N/V/D and fevers for the past 2 days. 69y female presented to the ED with complaints of N/V/D and fevers for the past 2 days. Pt unable to tolerate PO

## 2024-08-24 NOTE — ED PROVIDER NOTE - CLINICAL SUMMARY MEDICAL DECISION MAKING FREE TEXT BOX
pt is a 70 yo wf with no pmh who returned from travelling from [place] on Tuesday . on Wed pt noted n/v/d and fever. This continued until Thursday where diarrhea got much worse uncontrollable. labs including stool studies, ivf, ct abdomen and pelvis. pt found to have high bands. Flagyl added. Admission.

## 2024-08-24 NOTE — ED ADULT NURSE NOTE - CHIEF COMPLAINT QUOTE
Pt presents from home complaining of N/V/D and fevers x3 days. Unable to tolerate PO. States she traveled to [facility] and returned on 8/20. Since return she has been feeling unwell. Pt appears pale and lethargic but remains awake and alert. Hx Hypotension and borderline DM

## 2024-08-24 NOTE — H&P ADULT - HISTORY OF PRESENT ILLNESS
70 yo wf with no pmh p/w diarrhea and fever. Returned from [place] on Tuesday, ssx of non bloody watery severe diarrhea with associated fevers began wednesday. + abd cramping, nausea and vomiting as well. Endorses raw food ingestion on Tuesday where she had mussels. CTAP: pan proctocolitis. +bandemic    ED course: IVF + flagyl  Febrile to 102, WBC:3  UA ++ however she denies ssx      social: neg x 3  Shx: neg. colonscopy 3 years ago, + serrated polyp identified, pt due for C scope next month  Fhx: no h/o BD or colon cancer

## 2024-08-24 NOTE — ED PROVIDER NOTE - CARE PLAN
1 Principal Discharge DX:	Pancolitis   Principal Discharge DX:	Acute sepsis  Secondary Diagnosis:	Hypokalemia  Secondary Diagnosis:	Hyponatremia  Secondary Diagnosis:	Dehydration  Secondary Diagnosis:	Pancolitis

## 2024-08-24 NOTE — H&P ADULT - ASSESSMENT
#Sepsis 2/2 to pancolitis  - admit to ms  - s/p flagyl in Ed   -add cipro  - generous ivf  - FLD for now and adat  - anti-emetics  - stool studies  - contact iso  - dvt px: SCD    Time spent  coordinating the patient's care. This includes reviewing documentation pertinent to this admission, results and imaging. Further tests, medications, and procedures have been ordered as indicated. Laboratory results and the plan of care were communicated to the patient.  Discussed care plan with nursing and will discuss plan and care with appropriate   consultant(s). Supporting documentation was completed and added to the patient's chart.     I spent 75 minutes during this encounter

## 2024-08-25 LAB
ADD ON TEST-SPECIMEN IN LAB: SIGNIFICANT CHANGE UP
ANION GAP SERPL CALC-SCNC: 3 MMOL/L — LOW (ref 5–17)
BASOPHILS # BLD AUTO: 0.02 K/UL — SIGNIFICANT CHANGE UP (ref 0–0.2)
BASOPHILS NFR BLD AUTO: 0.5 % — SIGNIFICANT CHANGE UP (ref 0–2)
BUN SERPL-MCNC: 7 MG/DL — SIGNIFICANT CHANGE UP (ref 7–23)
CALCIUM SERPL-MCNC: 8.1 MG/DL — LOW (ref 8.5–10.1)
CHLORIDE SERPL-SCNC: 110 MMOL/L — HIGH (ref 96–108)
CO2 SERPL-SCNC: 25 MMOL/L — SIGNIFICANT CHANGE UP (ref 22–31)
CREAT SERPL-MCNC: 0.75 MG/DL — SIGNIFICANT CHANGE UP (ref 0.5–1.3)
EGFR: 85 ML/MIN/1.73M2 — SIGNIFICANT CHANGE UP
EOSINOPHIL # BLD AUTO: 0.02 K/UL — SIGNIFICANT CHANGE UP (ref 0–0.5)
EOSINOPHIL NFR BLD AUTO: 0.5 % — SIGNIFICANT CHANGE UP (ref 0–6)
GLUCOSE SERPL-MCNC: 100 MG/DL — HIGH (ref 70–99)
HCT VFR BLD CALC: 34.2 % — LOW (ref 34.5–45)
HGB BLD-MCNC: 11.7 G/DL — SIGNIFICANT CHANGE UP (ref 11.5–15.5)
IMM GRANULOCYTES NFR BLD AUTO: 0.2 % — SIGNIFICANT CHANGE UP (ref 0–0.9)
LYMPHOCYTES # BLD AUTO: 1.07 K/UL — SIGNIFICANT CHANGE UP (ref 1–3.3)
LYMPHOCYTES # BLD AUTO: 26.7 % — SIGNIFICANT CHANGE UP (ref 13–44)
MAGNESIUM SERPL-MCNC: 2.3 MG/DL — SIGNIFICANT CHANGE UP (ref 1.6–2.6)
MCHC RBC-ENTMCNC: 32.8 PG — SIGNIFICANT CHANGE UP (ref 27–34)
MCHC RBC-ENTMCNC: 34.2 GM/DL — SIGNIFICANT CHANGE UP (ref 32–36)
MCV RBC AUTO: 95.8 FL — SIGNIFICANT CHANGE UP (ref 80–100)
MONOCYTES # BLD AUTO: 0.92 K/UL — HIGH (ref 0–0.9)
MONOCYTES NFR BLD AUTO: 22.9 % — HIGH (ref 2–14)
NEUTROPHILS # BLD AUTO: 1.97 K/UL — SIGNIFICANT CHANGE UP (ref 1.8–7.4)
NEUTROPHILS NFR BLD AUTO: 49.2 % — SIGNIFICANT CHANGE UP (ref 43–77)
PHOSPHATE SERPL-MCNC: 1.7 MG/DL — LOW (ref 2.5–4.5)
PLATELET # BLD AUTO: 159 K/UL — SIGNIFICANT CHANGE UP (ref 150–400)
POTASSIUM SERPL-MCNC: 4.1 MMOL/L — SIGNIFICANT CHANGE UP (ref 3.5–5.3)
POTASSIUM SERPL-SCNC: 4.1 MMOL/L — SIGNIFICANT CHANGE UP (ref 3.5–5.3)
RBC # BLD: 3.57 M/UL — LOW (ref 3.8–5.2)
RBC # FLD: 14.6 % — HIGH (ref 10.3–14.5)
SODIUM SERPL-SCNC: 138 MMOL/L — SIGNIFICANT CHANGE UP (ref 135–145)
WBC # BLD: 4.01 K/UL — SIGNIFICANT CHANGE UP (ref 3.8–10.5)
WBC # FLD AUTO: 4.01 K/UL — SIGNIFICANT CHANGE UP (ref 3.8–10.5)

## 2024-08-25 PROCEDURE — 99233 SBSQ HOSP IP/OBS HIGH 50: CPT

## 2024-08-25 RX ORDER — SODIUM PHOSPHATE, MONOBASIC, MONOHYDRATE AND SODIUM PHOSPHATE, DIBASIC ANHYDROUS 276; 142 MG/ML; MG/ML
15 INJECTION, SOLUTION INTRAVENOUS EVERY 4 HOURS
Refills: 0 | Status: COMPLETED | OUTPATIENT
Start: 2024-08-25 | End: 2024-08-25

## 2024-08-25 RX ADMIN — AZITHROMYCIN 255 MILLIGRAM(S): 500 TABLET, FILM COATED ORAL at 11:30

## 2024-08-25 RX ADMIN — SODIUM PHOSPHATE, MONOBASIC, MONOHYDRATE AND SODIUM PHOSPHATE, DIBASIC ANHYDROUS 62.5 MILLIMOLE(S): 276; 142 INJECTION, SOLUTION INTRAVENOUS at 13:57

## 2024-08-25 RX ADMIN — SODIUM PHOSPHATE, MONOBASIC, MONOHYDRATE AND SODIUM PHOSPHATE, DIBASIC ANHYDROUS 62.5 MILLIMOLE(S): 276; 142 INJECTION, SOLUTION INTRAVENOUS at 18:22

## 2024-08-25 RX ADMIN — ROSUVASTATIN CALCIUM 10 MILLIGRAM(S): 10 TABLET ORAL at 20:50

## 2024-08-25 NOTE — PROGRESS NOTE ADULT - SUBJECTIVE AND OBJECTIVE BOX
CC: diarrhea (24 Aug 2024 08:29)    HPI:   70 yo wf with no pmh p/w diarrhea and fever. Returned from [place] on Tuesday, ssx of non bloody watery severe diarrhea with associated fevers began wednesday. + abd cramping, nausea and vomiting as well. Endorses raw food ingestion on Tuesday where she had mussels. CTAP: pan proctocolitis. +bandemic  ED course: IVF + flagyl  Febrile to 102, WBC:3  UA ++ however she denies ssx        INTERVAL HPI/OVERNIGHT EVENTS:    Vital Signs Last 24 Hrs  T(C): 36.7 (25 Aug 2024 07:42), Max: 37.2 (24 Aug 2024 13:20)  T(F): 98.1 (25 Aug 2024 07:42), Max: 99 (24 Aug 2024 13:20)  HR: 65 (25 Aug 2024 07:42) (65 - 84)  BP: 100/59 (25 Aug 2024 07:42) (100/59 - 106/72)  BP(mean): --  RR: 18 (25 Aug 2024 07:42) (18 - 18)  SpO2: 96% (25 Aug 2024 07:42) (95% - 98%)    Parameters below as of 25 Aug 2024 07:42  Patient On (Oxygen Delivery Method): room air        REVIEW OF SYSTEMS:  All other review of systems is negative unless indicated above.        PHYSICAL EXAM:  General: in no acute distress  Eyes: PERRLA, EOMI; conjunctiva and sclera clear  Head: Normocephalic; atraumatic  ENMT: No nasal discharge; airway clear  Neck: Supple; non tender; no masses  Respiratory: No wheezes, rales or rhonchi  Cardiovascular: Regular rate and rhythm. S1 and S2 Normal; No murmurs, gallops or rubs  Gastrointestinal: Soft non-tender non-distended; Normal bowel sounds  Genitourinary: No  suprapubic  tenderness  Extremities: Normal range of motion, No clubbing, cyanosis or edema  Vascular: Peripheral pulses palpable 2+ bilaterally  Neurological: Alert and oriented x4  Skin: Warm and dry. No acute rash  Lymph Nodes: No acute cervical adenopathy  Musculoskeletal: Normal muscle tone, without deformities  Psychiatric: Cooperative and appropriate    LABS:                         11.7   4.01  )-----------( 159      ( 25 Aug 2024 07:57 )             34.2     25 Aug 2024 07:57    138    |  110    |  7      ----------------------------<  100    4.1     |  25     |  0.75     Ca    8.1        25 Aug 2024 07:57    TPro  6.5    /  Alb  3.0    /  TBili  1.1    /  DBili  x      /  AST  22     /  ALT  26     /  AlkPhos  44     24 Aug 2024 00:29    PT/INR - ( 24 Aug 2024 00:29 )   PT: 12.1 sec;   INR: 1.07 ratio    PTT - ( 24 Aug 2024 00:29 )  PTT:26.3 sec          LIVER FUNCTIONS - ( 24 Aug 2024 00:29 )  Alb: 3.0 g/dL / Pro: 6.5 gm/dL / ALK PHOS: 44 U/L / ALT: 26 U/L / AST: 22 U/L / GGT: x           Urinalysis Basic - ( 25 Aug 2024 07:57 )    Color: x / Appearance: x / SG: x / pH: x  Gluc: 100 mg/dL / Ketone: x  / Bili: x / Urobili: x   Blood: x / Protein: x / Nitrite: x   Leuk Esterase: x / RBC: x / WBC x   Sq Epi: x / Non Sq Epi: x / Bacteria: x        MEDICATIONS  (STANDING):  azithromycin  IVPB 500 milliGRAM(s) IV Intermittent every 24 hours  azithromycin  IVPB      rosuvastatin 10 milliGRAM(s) Oral at bedtime  sodium chloride 0.9%. 1000 milliLiter(s) (100 mL/Hr) IV Continuous <Continuous>    MEDICATIONS  (PRN):  acetaminophen     Tablet .. 650 milliGRAM(s) Oral every 6 hours PRN Temp greater or equal to 38C (100.4F), Mild Pain (1 - 3)  aluminum hydroxide/magnesium hydroxide/simethicone Suspension 30 milliLiter(s) Oral every 4 hours PRN Dyspepsia  melatonin 3 milliGRAM(s) Oral at bedtime PRN Insomnia  ondansetron Injectable 4 milliGRAM(s) IV Push every 8 hours PRN Nausea and/or Vomiting      RADIOLOGY & ADDITIONAL TESTS:    ACC: [id number] EXAM:  CT ABDOMEN AND PELVIS IC   ORDERED BY: FINN MCFARLANE     PROCEDURE DATE:  08/24/2024          INTERPRETATION:  CLINICAL INFORMATION: Severe diarrhea.    COMPARISON: None.    CONTRAST/COMPLICATIONS:  IV Contrast: Omnipaque 350  90 cc administered   0 cc discarded  Oral Contrast: NONE  Complications: None reported at time of study completion    PROCEDURE:  CT of the Abdomen and Pelvis was performed.  Sagittal and coronal reformats were performed.    FINDINGS:  LOWER CHEST: Mild bibasilar dependent and platelike atelectasis.    LIVER: Hepatomegaly. Subcentimeter low-attenuation lesions that are too   small to characterize.  BILE DUCTS: Normal caliber.  GALLBLADDER: Within normal limits.  SPLEEN: Within normal limits.  PANCREAS: Within normal limits.  ADRENALS: Within normal limits.  KIDNEYS/URETERS: Kidneys enhance symmetrically without hydronephrosis or   focal renal parenchymal lesion.    BLADDER: Within normal limits.  REPRODUCTIVE ORGANS: Uterus and adnexa are unremarkable apart from   left-sided pelvic varices.    BOWEL: No bowel obstruction. Appendix is normal. Thickening of the walls   of the ascending colon, transverse colon, descending colon, sigmoid   colon, and rectum with mucosal hyperenhancement and mild pericolonic   inflammatory change.  PERITONEUM/RETROPERITONEUM: Small volume pelvic ascites. No   pneumoperitoneum or loculated collection to suggest abscess.  VESSELS: Atherosclerotic calcifications of the aortoiliac tree. Normal   caliber abdominal aorta.  LYMPH NODES: Prominent subcentimeter retroperitoneal and right lower   quadrant mesenteric lymph nodes, likely reactive.  ABDOMINAL WALL: Tiny fat-containing umbilical hernia.  BONES: Degenerative changes of the spine.    IMPRESSION:  Pan proctocolitis.     CC: diarrhea (24 Aug 2024 08:29)    HPI: 70 yo wf with no pmh p/w diarrhea and fever. Returned from [facility] on Tuesday, ssx of non bloody watery severe diarrhea with associated fevers began Wednesday.  + abd cramping, nausea and vomiting as well. Endorses raw food ingestion on Tuesday where she had mussels. CTAP: pan proctocolitis. +bandemic  ED course: IVF + flagyl. Febrile to 102, WBC:3. UA ++ however she denies sx    INTERVAL HPI/ OVERNIGHT EVENTS: chart reviewed, Pt was seen and examined, confirmed history above, reports feels better, Nausea improved, no vomiting , had  loose stool,  tolerates clears, ambulated to the bathroom, no dizziness, feels generally weak. Results and further Plan discussed      Vital Signs Last 24 Hrs  T(C): 36.7 (25 Aug 2024 07:42), Max: 37.2 (24 Aug 2024 13:20)  T(F): 98.1 (25 Aug 2024 07:42), Max: 99 (24 Aug 2024 13:20)  HR: 65 (25 Aug 2024 07:42) (65 - 84)  BP: 100/59 (25 Aug 2024 07:42) (100/59 - 106/72)  RR: 18 (25 Aug 2024 07:42) (18 - 18)  SpO2: 96% (25 Aug 2024 07:42) (95% - 98%)    Parameters below as of 25 Aug 2024 07:42  Patient On (Oxygen Delivery Method): room air        REVIEW OF SYSTEMS:  All other review of systems is negative unless indicated above.        PHYSICAL EXAM:  General: in no acute distress  Eyes:  EOMI; conjunctiva and sclera clear  Head: Normocephalic; atraumatic  ENMT: No nasal discharge; airway clear  Respiratory: Good air entry b/l, No wheezes, rales or rhonchi  Cardiovascular: Regular rate and rhythm. S1 and S2 Normal;   Gastrointestinal: Soft non-tender non-distended; Normal bowel sounds  Genitourinary: No  suprapubic  tenderness  Extremities: No edema  Neurological: Alert and oriented x 3, non focal   Skin: Warm and dry. No acute rash  Musculoskeletal: Normal muscle tone, without deformities  Psychiatric: Cooperative and appropriate    LABS:                         11.7   4.01  )-----------( 159      ( 25 Aug 2024 07:57 )             34.2     25 Aug 2024 07:57    138    |  110    |  7      ----------------------------<  100    4.1     |  25     |  0.75     Ca    8.1        25 Aug 2024 07:57    TPro  6.5    /  Alb  3.0    /  TBili  1.1    /  DBili  x      /  AST  22     /  ALT  26     /  AlkPhos  44     24 Aug 2024 00:29    PT/INR - ( 24 Aug 2024 00:29 )   PT: 12.1 sec;   INR: 1.07 ratio    PTT - ( 24 Aug 2024 00:29 )  PTT:26.3 sec          LIVER FUNCTIONS - ( 24 Aug 2024 00:29 )  Alb: 3.0 g/dL / Pro: 6.5 gm/dL / ALK PHOS: 44 U/L / ALT: 26 U/L / AST: 22 U/L / GGT: x           Urinalysis Basic - ( 25 Aug 2024 07:57 )    Color: x / Appearance: x / SG: x / pH: x  Gluc: 100 mg/dL / Ketone: x  / Bili: x / Urobili: x   Blood: x / Protein: x / Nitrite: x   Leuk Esterase: x / RBC: x / WBC x   Sq Epi: x / Non Sq Epi: x / Bacteria: x        MEDICATIONS  (STANDING):  azithromycin  IVPB 500 milliGRAM(s) IV Intermittent every 24 hours  azithromycin  IVPB      rosuvastatin 10 milliGRAM(s) Oral at bedtime  sodium chloride 0.9%. 1000 milliLiter(s) (100 mL/Hr) IV Continuous <Continuous>    MEDICATIONS  (PRN):  acetaminophen     Tablet .. 650 milliGRAM(s) Oral every 6 hours PRN Temp greater or equal to 38C (100.4F), Mild Pain (1 - 3)  aluminum hydroxide/magnesium hydroxide/simethicone Suspension 30 milliLiter(s) Oral every 4 hours PRN Dyspepsia  melatonin 3 milliGRAM(s) Oral at bedtime PRN Insomnia  ondansetron Injectable 4 milliGRAM(s) IV Push every 8 hours PRN Nausea and/or Vomiting      RADIOLOGY & ADDITIONAL TESTS:    ACC: [id number] EXAM:  CT ABDOMEN AND PELVIS IC   ORDERED BY: FINN MCFARLANE     PROCEDURE DATE:  08/24/2024          INTERPRETATION:  CLINICAL INFORMATION: Severe diarrhea.    COMPARISON: None.    CONTRAST/COMPLICATIONS:  IV Contrast: Omnipaque 350  90 cc administered   0 cc discarded  Oral Contrast: NONE  Complications: None reported at time of study completion    PROCEDURE:  CT of the Abdomen and Pelvis was performed.  Sagittal and coronal reformats were performed.    FINDINGS:  LOWER CHEST: Mild bibasilar dependent and platelike atelectasis.    LIVER: Hepatomegaly. Subcentimeter low-attenuation lesions that are too   small to characterize.  BILE DUCTS: Normal caliber.  GALLBLADDER: Within normal limits.  SPLEEN: Within normal limits.  PANCREAS: Within normal limits.  ADRENALS: Within normal limits.  KIDNEYS/URETERS: Kidneys enhance symmetrically without hydronephrosis or   focal renal parenchymal lesion.    BLADDER: Within normal limits.  REPRODUCTIVE ORGANS: Uterus and adnexa are unremarkable apart from   left-sided pelvic varices.    BOWEL: No bowel obstruction. Appendix is normal. Thickening of the walls   of the ascending colon, transverse colon, descending colon, sigmoid   colon, and rectum with mucosal hyperenhancement and mild pericolonic   inflammatory change.  PERITONEUM/RETROPERITONEUM: Small volume pelvic ascites. No   pneumoperitoneum or loculated collection to suggest abscess.  VESSELS: Atherosclerotic calcifications of the aortoiliac tree. Normal   caliber abdominal aorta.  LYMPH NODES: Prominent subcentimeter retroperitoneal and right lower   quadrant mesenteric lymph nodes, likely reactive.  ABDOMINAL WALL: Tiny fat-containing umbilical hernia.  BONES: Degenerative changes of the spine.    IMPRESSION:  Pan proctocolitis.

## 2024-08-25 NOTE — PROGRESS NOTE ADULT - ASSESSMENT
#Sepsis 2/2 to pancolitis  - admit to ms  - s/p flagyl in Ed   -add cipro  - generous ivf  - FLD for now and adat  - anti-emetics  - stool studies  - contact iso  - dvt px: SCD    Time spent  coordinating the patient's care. This includes reviewing documentation pertinent to this admission, results and imaging. Further tests, medications, and procedures have been ordered as indicated. Laboratory results and the plan of care were communicated to the patient.  Discussed care plan with nursing and will discuss plan and care with appropriate   consultant(s). Supporting documentation was completed and added to the patient's chart.     I spent 75 minutes during this encounter  70 yo wf with  pmh of HTN admitted for:     #Sepsis 2/2 to Campylobacter Gastroenteritis and   pancolitis.   On admission with low WBCs and febrile  -CT abd/pelvis: pancolitis   - GI PCR: + Campylobacter  -CDiff: neg   - s/p flagyl/cipro, now switched to Azithromycin   - C/w IVF   - advance to FLD, if tolerates will order low fat diet   - anti-emetics  - contact iso    # Hyponatremia, Dehydration \due to GI losses  Improved with IVF, continue   labs in am       # Hypophosphatemia   replace IV   recheck in am       # HLD  C/w crestor     # DVT PPX: venodynes

## 2024-08-26 ENCOUNTER — TRANSCRIPTION ENCOUNTER (OUTPATIENT)
Age: 69
End: 2024-08-26

## 2024-08-26 VITALS
OXYGEN SATURATION: 98 % | RESPIRATION RATE: 18 BRPM | DIASTOLIC BLOOD PRESSURE: 70 MMHG | HEART RATE: 64 BPM | TEMPERATURE: 98 F | SYSTOLIC BLOOD PRESSURE: 118 MMHG

## 2024-08-26 LAB
ANION GAP SERPL CALC-SCNC: 7 MMOL/L — SIGNIFICANT CHANGE UP (ref 5–17)
BUN SERPL-MCNC: 5 MG/DL — LOW (ref 7–23)
CALCIUM SERPL-MCNC: 8.4 MG/DL — LOW (ref 8.5–10.1)
CHLORIDE SERPL-SCNC: 109 MMOL/L — HIGH (ref 96–108)
CO2 SERPL-SCNC: 26 MMOL/L — SIGNIFICANT CHANGE UP (ref 22–31)
CREAT SERPL-MCNC: 0.78 MG/DL — SIGNIFICANT CHANGE UP (ref 0.5–1.3)
EGFR: 81 ML/MIN/1.73M2 — SIGNIFICANT CHANGE UP
GLUCOSE SERPL-MCNC: 103 MG/DL — HIGH (ref 70–99)
HCT VFR BLD CALC: 34.4 % — LOW (ref 34.5–45)
HGB BLD-MCNC: 11.9 G/DL — SIGNIFICANT CHANGE UP (ref 11.5–15.5)
MAGNESIUM SERPL-MCNC: 2.3 MG/DL — SIGNIFICANT CHANGE UP (ref 1.6–2.6)
MCHC RBC-ENTMCNC: 33.1 PG — SIGNIFICANT CHANGE UP (ref 27–34)
MCHC RBC-ENTMCNC: 34.6 GM/DL — SIGNIFICANT CHANGE UP (ref 32–36)
MCV RBC AUTO: 95.6 FL — SIGNIFICANT CHANGE UP (ref 80–100)
PHOSPHATE SERPL-MCNC: 2.9 MG/DL — SIGNIFICANT CHANGE UP (ref 2.5–4.5)
PLATELET # BLD AUTO: 192 K/UL — SIGNIFICANT CHANGE UP (ref 150–400)
POTASSIUM SERPL-MCNC: 3 MMOL/L — LOW (ref 3.5–5.3)
POTASSIUM SERPL-SCNC: 3 MMOL/L — LOW (ref 3.5–5.3)
RBC # BLD: 3.6 M/UL — LOW (ref 3.8–5.2)
RBC # FLD: 14.6 % — HIGH (ref 10.3–14.5)
SODIUM SERPL-SCNC: 142 MMOL/L — SIGNIFICANT CHANGE UP (ref 135–145)
WBC # BLD: 3.91 K/UL — SIGNIFICANT CHANGE UP (ref 3.8–10.5)
WBC # FLD AUTO: 3.91 K/UL — SIGNIFICANT CHANGE UP (ref 3.8–10.5)

## 2024-08-26 PROCEDURE — 99239 HOSP IP/OBS DSCHRG MGMT >30: CPT

## 2024-08-26 RX ORDER — FLUCONAZOLE 150 MG/1
1 TABLET ORAL
Qty: 1 | Refills: 0
Start: 2024-08-26 | End: 2024-08-26

## 2024-08-26 RX ORDER — FLUCONAZOLE 150 MG/1
1 TABLET ORAL
Qty: 1 | Refills: 0
Start: 2024-08-26

## 2024-08-26 RX ORDER — FLUCONAZOLE 150 MG/1
150 TABLET ORAL ONCE
Refills: 0 | Status: DISCONTINUED | OUTPATIENT
Start: 2024-08-26 | End: 2024-08-26

## 2024-08-26 RX ORDER — FLUCONAZOLE 150 MG/1
150 TABLET ORAL DAILY
Refills: 0 | Status: DISCONTINUED | OUTPATIENT
Start: 2024-08-26 | End: 2024-08-26

## 2024-08-26 RX ORDER — NITROFURANTOIN MONOHYD/M-CRYST 100 MG
1 CAPSULE ORAL
Refills: 0 | DISCHARGE

## 2024-08-26 RX ORDER — CHLORHEXIDINE GLUCONATE 40 MG/ML
1 SOLUTION TOPICAL
Refills: 0 | Status: DISCONTINUED | OUTPATIENT
Start: 2024-08-26 | End: 2024-08-26

## 2024-08-26 RX ORDER — AZITHROMYCIN 500 MG/1
500 TABLET, FILM COATED ORAL DAILY
Refills: 0 | Status: DISCONTINUED | OUTPATIENT
Start: 2024-08-26 | End: 2024-08-26

## 2024-08-26 RX ORDER — ROSUVASTATIN CALCIUM 10 MG/1
1 TABLET ORAL
Refills: 0 | DISCHARGE

## 2024-08-26 RX ORDER — POTASSIUM CHLORIDE 10 MEQ
40 TABLET, EXT RELEASE, PARTICLES/CRYSTALS ORAL ONCE
Refills: 0 | Status: COMPLETED | OUTPATIENT
Start: 2024-08-26 | End: 2024-08-26

## 2024-08-26 RX ORDER — POTASSIUM CHLORIDE 10 MEQ
10 TABLET, EXT RELEASE, PARTICLES/CRYSTALS ORAL
Refills: 0 | Status: COMPLETED | OUTPATIENT
Start: 2024-08-26 | End: 2024-08-26

## 2024-08-26 RX ORDER — ROSUVASTATIN CALCIUM 10 MG/1
1 TABLET ORAL
Qty: 30 | Refills: 0
Start: 2024-08-26 | End: 2024-09-24

## 2024-08-26 RX ORDER — AZITHROMYCIN 500 MG/1
1 TABLET, FILM COATED ORAL
Qty: 3 | Refills: 0
Start: 2024-08-26 | End: 2024-08-28

## 2024-08-26 RX ADMIN — Medication 100 MILLIEQUIVALENT(S): at 12:37

## 2024-08-26 RX ADMIN — AZITHROMYCIN 500 MILLIGRAM(S): 500 TABLET, FILM COATED ORAL at 08:45

## 2024-08-26 RX ADMIN — Medication 100 MILLIEQUIVALENT(S): at 08:47

## 2024-08-26 RX ADMIN — Medication 40 MILLIEQUIVALENT(S): at 08:45

## 2024-08-26 RX ADMIN — Medication 100 MILLIEQUIVALENT(S): at 10:46

## 2024-08-26 NOTE — DISCHARGE NOTE PROVIDER - NSDCFUADDAPPT_GEN_ALL_CORE_FT
APPTS ARE READY TO BE MADE: [x] YES    Best Family or Patient Contact (if needed):    Additional Information about above appointments (if needed):    1: post hospital discharged follow up  2: post hospital discharge follow up for GI eval  3:     Other comments or requests:

## 2024-08-26 NOTE — DISCHARGE NOTE PROVIDER - CARE PROVIDER_API CALL
[address]  Cardiovascular Disease  [address], [address]  [place] [address]  Phone: [phone]  Fax: [phone]  Established Patient  Follow Up Time: 1-3 days    Ravinder Cat  Gastroenterology  [address] [place] [id number]  Phone: [phone]  Fax: [phone]  Follow Up Time: 1 week

## 2024-08-26 NOTE — CONSULT NOTE ADULT - SUBJECTIVE AND OBJECTIVE BOX
Patient is a 69y old  Female who presents with a chief complaint of diarrhea (26 Aug 2024 07:58)    HPI:   70 yo wf with no pmh p/w diarrhea and fever. Returned from [place] on Tuesday, ssx of non bloody watery severe diarrhea with associated fevers began wednesday. + abd cramping, nausea and vomiting as well. Endorses raw food ingestion on Tuesday where she had mussels. CTAP: pan proctocolitis. +bandemic ED course: IVF + flagyl  Febrile to 102, WBC:3 UA ++ however she denies ssx imaging remarkable for pancolitis, GI PCR showed campylobacter, started on azithromycin.         PMH: as above  PSH: as above  Meds: per reconciliation sheet, noted below  MEDICATIONS  (STANDING):  azithromycin   Tablet 500 milliGRAM(s) Oral daily  chlorhexidine 4% Liquid 1 Application(s) Topical <User Schedule>  potassium chloride  10 mEq/100 mL IVPB 10 milliEquivalent(s) IV Intermittent every 1 hour  rosuvastatin 10 milliGRAM(s) Oral at bedtime  sodium chloride 0.9%. 1000 milliLiter(s) (100 mL/Hr) IV Continuous <Continuous>      Allergies    Scallops (Unknown)  sulfa drugs (Unknown)  Kiwi (Unknown)    Intolerances      Social: no smoking, no alcohol, no illegal drugs; no recent travel, no exposure to TB  FAMILY HISTORY:     no history of premature cardiovascular disease in first degree relatives    ROS: + fever, chills, no HA, no dizziness, no sore throat, no blurry vision, no CP, no palpitations, no SOB, no cough, no abdominal pain, + diarrhea, no N/V, no dysuria, no leg pain, no claudication, no rash, no joint aches, no rectal pain or bleeding, no night sweats    All other systems reviewed and are negative    Vital Signs Last 24 Hrs  T(C): 36.4 (26 Aug 2024 08:30), Max: 36.7 (25 Aug 2024 16:37)  T(F): 97.5 (26 Aug 2024 08:30), Max: 98.1 (25 Aug 2024 16:37)  HR: 64 (26 Aug 2024 08:30) (64 - 66)  BP: 118/70 (26 Aug 2024 08:30) (103/67 - 118/70)  BP(mean): --  RR: 18 (26 Aug 2024 08:30) (18 - 18)  SpO2: 98% (26 Aug 2024 08:30) (96% - 98%)    Parameters below as of 26 Aug 2024 08:30  Patient On (Oxygen Delivery Method): room air      Daily     Daily     PE:  Constitutional: NAD  HEENT: NC/AT, EOMI, PERRLA, conjunctivae clear; ears and nose atraumatic; pharynx benign  Neck: supple; thyroid not palpable  Back: no tenderness  Respiratory: respiratory effort normal; clear to auscultation  Cardiovascular: S1S2 regular, no murmurs  Abdomen: soft, not tender, not distended, positive BS; liver and spleen WNL  Genitourinary: no suprapubic tenderness  Lymphatic: no LN palpable  Musculoskeletal: no muscle tenderness, no joint swelling or tenderness  Extremities: no pedal edema  Neurological/ Psychiatric: AxOx3, Judgement and insight normal;  moving all extremities  Skin: no rashes; no palpable lesions    Labs: all available labs reviewed                        11.9   3.91  )-----------( 192      ( 26 Aug 2024 05:59 )             34.4     08-26    142  |  109<H>  |  5<L>  ----------------------------<  103<H>  3.0<L>   |  26  |  0.78    Ca    8.4<L>      26 Aug 2024 05:59  Phos  2.9     08-26  Mg     2.3     08-26         Urinalysis Basic - ( 26 Aug 2024 05:59 )    Color: x / Appearance: x / SG: x / pH: x  Gluc: 103 mg/dL / Ketone: x  / Bili: x / Urobili: x   Blood: x / Protein: x / Nitrite: x   Leuk Esterase: x / RBC: x / WBC x   Sq Epi: x / Non Sq Epi: x / Bacteria: x          Radiology: all available radiological tests reviewed  < from: CT Abdomen and Pelvis w/ IV Cont (08.24.24 @ 05:28) >  ACC: [id number] EXAM:  CT ABDOMEN AND PELVIS IC   ORDERED BY: FINN MCFARLANE     PROCEDURE DATE:  08/24/2024          INTERPRETATION:  CLINICAL INFORMATION: Severe diarrhea.    COMPARISON: None.    CONTRAST/COMPLICATIONS:  IV Contrast: Omnipaque 350  90 cc administered   0 cc discarded  Oral Contrast: NONE  Complications: None reported at time of study completion    PROCEDURE:  CT of the Abdomen and Pelvis was performed.  Sagittal and coronal reformats were performed.    FINDINGS:  LOWER CHEST: Mild bibasilar dependent and platelike atelectasis.    LIVER: Hepatomegaly. Subcentimeter low-attenuation lesions that are too   small to characterize.  BILE DUCTS: Normal caliber.  GALLBLADDER: Within normal limits.  SPLEEN: Within normal limits.  PANCREAS: Within normal limits.  ADRENALS: Within normal limits.  KIDNEYS/URETERS: Kidneys enhance symmetrically without hydronephrosis or   focal renal parenchymal lesion.    BLADDER: Within normal limits.  REPRODUCTIVE ORGANS: Uterus and adnexa are unremarkable apart from   left-sided pelvic varices.    BOWEL: No bowel obstruction. Appendix is normal. Thickening of the walls   of the ascending colon, transverse colon, descending colon, sigmoid   colon, and rectum with mucosal hyperenhancement and mild pericolonic   inflammatory change.  PERITONEUM/RETROPERITONEUM: Small volume pelvic ascites. No   pneumoperitoneum or loculated collection to suggest abscess.  VESSELS: Atherosclerotic calcifications of the aortoiliac tree. Normal   caliber abdominal aorta.  LYMPH NODES: Prominent subcentimeter retroperitoneal and right lower   quadrant mesenteric lymph nodes, likely reactive.  ABDOMINAL WALL: Tiny fat-containing umbilical hernia.  BONES: Degenerative changes of the spine.    IMPRESSION:  Pan proctocolitis.        --- End of Report ---    < end of copied text >    Advanced directives addressed: full resuscitation Patient is a 69y old  Female who presents with a chief complaint of diarrhea (26 Aug 2024 07:58)    HPI:   68 yo wf with no pmh p/w diarrhea and fever. Returned from [place] on Tuesday, ssx of non bloody watery severe diarrhea with associated fevers began wednesday. + abd cramping, nausea and vomiting as well. Endorses raw food ingestion on Tuesday where she had mussels. CTAP: pan proctocolitis. +bandemic ED course: IVF + flagyl  Febrile to 102, WBC:3 UA ++ however she denies ssx imaging remarkable for pancolitis, GI PCR showed campylobacter, started on azithromycin.         PMH: as above  PSH: as above  Meds: per reconciliation sheet, noted below  MEDICATIONS  (STANDING):  azithromycin   Tablet 500 milliGRAM(s) Oral daily  chlorhexidine 4% Liquid 1 Application(s) Topical <User Schedule>  potassium chloride  10 mEq/100 mL IVPB 10 milliEquivalent(s) IV Intermittent every 1 hour  rosuvastatin 10 milliGRAM(s) Oral at bedtime  sodium chloride 0.9%. 1000 milliLiter(s) (100 mL/Hr) IV Continuous <Continuous>      Allergies    Scallops (Unknown)  sulfa drugs (Unknown)  Kiwi (Unknown)    Intolerances      Social: no smoking, no alcohol, no illegal drugs; no recent travel, no exposure to TB  FAMILY HISTORY:     no history of premature cardiovascular disease in first degree relatives    ROS: + fever, chills, no HA, no dizziness, no sore throat, no blurry vision, no CP, no palpitations, no SOB, no cough, no abdominal pain, + diarrhea, no N/V, no dysuria, no leg pain, no claudication, no rash, no joint aches, no rectal pain or bleeding, no night sweats    All other systems reviewed and are negative    Vital Signs Last 24 Hrs  T(C): 36.4 (26 Aug 2024 08:30), Max: 36.7 (25 Aug 2024 16:37)  T(F): 97.5 (26 Aug 2024 08:30), Max: 98.1 (25 Aug 2024 16:37)  HR: 64 (26 Aug 2024 08:30) (64 - 66)  BP: 118/70 (26 Aug 2024 08:30) (103/67 - 118/70)  BP(mean): --  RR: 18 (26 Aug 2024 08:30) (18 - 18)  SpO2: 98% (26 Aug 2024 08:30) (96% - 98%)    Parameters below as of 26 Aug 2024 08:30  Patient On (Oxygen Delivery Method): room air      Daily     Daily     PE:  Constitutional: NAD  HEENT: NC/AT, EOMI, PERRLA, conjunctivae clear; ears and nose atraumatic; pharynx benign  Neck: supple; thyroid not palpable  Back: no tenderness  Respiratory: respiratory effort normal; clear to auscultation  Cardiovascular: S1S2 regular, no murmurs  Abdomen: soft, not tender, not distended, positive BS; liver and spleen WNL  Genitourinary: no suprapubic tenderness  Lymphatic: no LN palpable  Musculoskeletal: no muscle tenderness, no joint swelling or tenderness  Extremities: no pedal edema  Neurological/ Psychiatric: AxOx3, Judgement and insight normal;  moving all extremities  Skin: no rashes; no palpable lesions    Labs: all available labs reviewed                        11.9   3.91  )-----------( 192      ( 26 Aug 2024 05:59 )             34.4     08-26    142  |  109<H>  |  5<L>  ----------------------------<  103<H>  3.0<L>   |  26  |  0.78    Ca    8.4<L>      26 Aug 2024 05:59  Phos  2.9     08-26  Mg     2.3     08-26         Urinalysis Basic - ( 26 Aug 2024 05:59 )    Color: x / Appearance: x / SG: x / pH: x  Gluc: 103 mg/dL / Ketone: x  / Bili: x / Urobili: x   Blood: x / Protein: x / Nitrite: x   Leuk Esterase: x / RBC: x / WBC x   Sq Epi: x / Non Sq Epi: x / Bacteria: x    Campylobacter: Detected:       Radiology: all available radiological tests reviewed  < from: CT Abdomen and Pelvis w/ IV Cont (08.24.24 @ 05:28) >  ACC: [id number] EXAM:  CT ABDOMEN AND PELVIS IC   ORDERED BY: FINN MCFARLANE     PROCEDURE DATE:  08/24/2024          INTERPRETATION:  CLINICAL INFORMATION: Severe diarrhea.    COMPARISON: None.    CONTRAST/COMPLICATIONS:  IV Contrast: Omnipaque 350  90 cc administered   0 cc discarded  Oral Contrast: NONE  Complications: None reported at time of study completion    PROCEDURE:  CT of the Abdomen and Pelvis was performed.  Sagittal and coronal reformats were performed.    FINDINGS:  LOWER CHEST: Mild bibasilar dependent and platelike atelectasis.    LIVER: Hepatomegaly. Subcentimeter low-attenuation lesions that are too   small to characterize.  BILE DUCTS: Normal caliber.  GALLBLADDER: Within normal limits.  SPLEEN: Within normal limits.  PANCREAS: Within normal limits.  ADRENALS: Within normal limits.  KIDNEYS/URETERS: Kidneys enhance symmetrically without hydronephrosis or   focal renal parenchymal lesion.    BLADDER: Within normal limits.  REPRODUCTIVE ORGANS: Uterus and adnexa are unremarkable apart from   left-sided pelvic varices.    BOWEL: No bowel obstruction. Appendix is normal. Thickening of the walls   of the ascending colon, transverse colon, descending colon, sigmoid   colon, and rectum with mucosal hyperenhancement and mild pericolonic   inflammatory change.  PERITONEUM/RETROPERITONEUM: Small volume pelvic ascites. No   pneumoperitoneum or loculated collection to suggest abscess.  VESSELS: Atherosclerotic calcifications of the aortoiliac tree. Normal   caliber abdominal aorta.  LYMPH NODES: Prominent subcentimeter retroperitoneal and right lower   quadrant mesenteric lymph nodes, likely reactive.  ABDOMINAL WALL: Tiny fat-containing umbilical hernia.  BONES: Degenerative changes of the spine.    IMPRESSION:  Pan proctocolitis.          Advanced directives addressed: full resuscitation

## 2024-08-26 NOTE — DISCHARGE NOTE PROVIDER - NSDCMRMEDTOKEN_GEN_ALL_CORE_FT
nitrofurantoin macrocrystals-monohydrate 100 mg oral capsule: 1 cap(s) orally prn as needed for after [place]  Premarin 0.625 mg/g vaginal cream with applicator: 1 gram(s) intravaginally 3 times a week in the evening  Restasis 0.05% ophthalmic emulsion: 1 drop(s) in each eye 2 times a day  rosuvastatin 10 mg oral tablet: 1 tab(s) orally  Vitamin B12 1000 mcg oral tablet: 1 tab(s) orally once a day  Vitamin D3 25 mcg (1000 intl units) oral capsule: 1 cap(s) orally once a day   azithromycin 500 mg oral tablet: 1 tab(s) orally once a day  Premarin 0.625 mg/g vaginal cream with applicator: 1 gram(s) intravaginally 3 times a week in the evening  Restasis 0.05% ophthalmic emulsion: 1 drop(s) in each eye 2 times a day  rosuvastatin 10 mg oral tablet: 1 tab(s) orally once a day  Vitamin B12 1000 mcg oral tablet: 1 tab(s) orally once a day  Vitamin D3 25 mcg (1000 intl units) oral capsule: 1 cap(s) orally once a day

## 2024-08-26 NOTE — DISCHARGE NOTE PROVIDER - NSDCCPTREATMENT_GEN_ALL_CORE_FT
PRINCIPAL PROCEDURE  Procedure: CT abdomen  Findings and Treatment: FINDINGS:  LOWER CHEST: Mild bibasilar dependent and platelike atelectasis.  LIVER: Hepatomegaly. Subcentimeter low-attenuation lesions that are too   small to characterize.  BILE DUCTS: Normal caliber.  GALLBLADDER: Within normal limits.  SPLEEN: Within normal limits.  PANCREAS: Within normal limits.  ADRENALS: Within normal limits.  KIDNEYS/URETERS: Kidneys enhance symmetrically without hydronephrosis or   focal renal parenchymal lesion.  the walls   of the ascending colon, transverse colon, descending colon, sigmoid   colon, and rectum with mucosal hyperenhancement and mild   BLADDER: Within normal limits.  REPRODUCTIVE ORGANS: Uterus and adnexa are unremarkable apart from   left-sided pelvic varices.  BOWEL: No bowel obstruction. Appendix is normal. Thickening of pericolonic   inflammatory change.  PERITONEUM/RETROPERITONEUM: Small volume pelvic ascites. No   pneumoperitoneum or loculated collection to suggest abscess.  VESSELS: Atherosclerotic calcifications of the aortoiliac tree. Normal   caliber abdominal aorta.  LYMPH NODES: Prominent subcentimeter retroperitoneal and right lower   quadrant mesenteric lymph nodes, likely reactive.  ABDOMINAL WALL: Tiny fat-containing umbilical hernia.  BONES: Degenerative changes of the spine.

## 2024-08-26 NOTE — DISCHARGE NOTE PROVIDER - NSDCCPCAREPLAN_GEN_ALL_CORE_FT
PRINCIPAL DISCHARGE DIAGNOSIS  Diagnosis: Acute sepsis  Assessment and Plan of Treatment: - secondary to pancolitis in the setting of bandemia  /  fevers  - your stool cultures showed campylobacter  - campylobacter infections are typically self-limiting  - in the hospital you have received interavenous antibiotics      SECONDARY DISCHARGE DIAGNOSES  Diagnosis: Hypokalemia  Assessment and Plan of Treatment: - repleted    Diagnosis: Hyponatremia  Assessment and Plan of Treatment: - resolved with hydration    Diagnosis: Dehydration  Assessment and Plan of Treatment: - IV fluids     PRINCIPAL DISCHARGE DIAGNOSIS  Diagnosis: Acute sepsis  Assessment and Plan of Treatment: - secondary to pancolitis in the setting of bandemia  /  fevers  - your stool cultures showed campylobacter - 3 more days of zithromax  - campylobacter infections are typically self-limiting  - in the hospital you have received interavenous antibiotics  - CT: LYMPH NODES: Prominent subcentimeter retroperitoneal and right lower quadrant mesenteric lymph nodes, likely reactive.      SECONDARY DISCHARGE DIAGNOSES  Diagnosis: Hypokalemia  Assessment and Plan of Treatment: - repleted    Diagnosis: Hyponatremia  Assessment and Plan of Treatment: - resolved with hydration    Diagnosis: Dehydration  Assessment and Plan of Treatment: - IV fluids

## 2024-08-26 NOTE — DISCHARGE NOTE PROVIDER - HOSPITAL COURSE
CC: Diarrhea    Patient is a 70 yo wf with no pmh p/w diarrhea and fever. Returned from [place] on Tuesday, ssx of non bloody watery severe diarrhea with associated fevers began Wednesday.  + abd cramping, nausea and vomiting as well. Endorses raw food ingestion on Tuesday where she had mussels. CTAP: pan proctocolitis. (+) bandemic  ED course: IVF + flagyl. Febrile to 102, WBC:3. UA ++ however she denies sx    INTERVAL HPI/ OVERNIGHT EVENTS: chart reviewed, Pt was seen and examined, confirmed history above, reports feels better, Nausea improved, no vomiting , had  loose stool,  tolerates clears, ambulated to the bathroom, no dizziness, feels generally weak. Results and further Plan discussed      Physical Exam:   GENERAL APPEARANCE:  NAD, hemodynamically stable  T(C): 36.7 (08-25-24 @ 16:37), Max: 36.7 (08-25-24 @ 16:37)  HR: 66 (08-25-24 @ 16:37) (66 - 66)  BP: 103/67 (08-25-24 @ 16:37) (103/67 - 103/67)  RR: 18 (08-25-24 @ 16:37) (18 - 18)  SpO2: 96% (08-25-24 @ 16:37) (96% - 96%)  HEENT:  Head is normocephalic    Skin:  Warm and dry without any rash   NECK:  Supple without lymphadenopathy.   HEART:  Regular rate and rhythm. normal S1 and S2, No M/R/G  LUNGS:  Good ins/exp effort, no W/R/R/C  ABDOMEN:  Soft, nontender, nondistended with good bowel sounds heard  EXTREMITIES:  Without cyanosis, clubbing or edema.   NEUROLOGICAL:  Gross nonfocal

## 2024-08-26 NOTE — CONSULT NOTE ADULT - ASSESSMENT
68 yo wf with no pmh p/w diarrhea and fever. Returned from [place] on Tuesday, ssx of non bloody watery severe diarrhea with associated fevers began wednesday. + abd cramping, nausea and vomiting as well. Endorses raw food ingestion on Tuesday where she had mussels. CTAP: pan proctocolitis. +bandemic ED course: IVF + flagyl  Febrile to 102, WBC:3 UA ++ however she denies ssx imaging remarkable for pancolitis, GI PCR showed campylobacter, started on azithromycin.       68 yo wf with no pmh p/w diarrhea and fever. Returned from [place] on Tuesday, ssx of non bloody watery severe diarrhea with associated fevers began wednesday. + abd cramping, nausea and vomiting as well. Endorses raw food ingestion on Tuesday where she had mussels. CTAP: pan proctocolitis. +bandemic ED course: IVF + flagyl Febrile to 102, WBC:3 UA ++ however she denies ssx imaging remarkable for pancolitis, GI PCR showed campylobacter, started on azithromycin.     1. Fever. Pancolitis. Diarrheal syndrome - Campylobacter  - imaging reviewed  - slowly improving  - on day #3 azithromycin  - complete 3-5 day course  - will order diflucan 150mg x 1 PRN yeast infection  - monitor temps  - contact precautions  - fu cbc      IV to Po abx token not applicable to case

## 2024-08-26 NOTE — DISCHARGE NOTE NURSING/CASE MANAGEMENT/SOCIAL WORK - PATIENT PORTAL LINK FT
You can access the FollowMyHealth Patient Portal offered by [facility] by registering at the following website: [email or web address]. By joining [employer]’s FollowMyHealth portal, you will also be able to view your health information using other applications (apps) compatible with our system.

## 2024-08-26 NOTE — DISCHARGE NOTE PROVIDER - PROVIDER TOKENS
PROVIDER:[TOKEN:[[id number]],FOLLOWUP:[1-3 days],ESTABLISHEDPATIENT:[T]],PROVIDER:[TOKEN:[[id number]],FOLLOWUP:[1 week]]

## 2024-08-30 DIAGNOSIS — E87.6 HYPOKALEMIA: ICD-10-CM

## 2024-08-30 DIAGNOSIS — E78.5 HYPERLIPIDEMIA, UNSPECIFIED: ICD-10-CM

## 2024-08-30 DIAGNOSIS — A04.5 CAMPYLOBACTER ENTERITIS: ICD-10-CM

## 2024-08-30 DIAGNOSIS — Z88.2 ALLERGY STATUS TO SULFONAMIDES: ICD-10-CM

## 2024-08-30 DIAGNOSIS — E86.0 DEHYDRATION: ICD-10-CM

## 2024-08-30 DIAGNOSIS — A41.9 SEPSIS, UNSPECIFIED ORGANISM: ICD-10-CM

## 2024-08-30 DIAGNOSIS — Z91.018 ALLERGY TO OTHER FOODS: ICD-10-CM

## 2024-08-30 DIAGNOSIS — E87.1 HYPO-OSMOLALITY AND HYPONATREMIA: ICD-10-CM

## 2024-08-30 DIAGNOSIS — E83.39 OTHER DISORDERS OF PHOSPHORUS METABOLISM: ICD-10-CM

## 2025-03-24 PROBLEM — Z78.9 OTHER SPECIFIED HEALTH STATUS: Chronic | Status: ACTIVE | Noted: 2024-08-27

## 2025-05-22 ENCOUNTER — APPOINTMENT (OUTPATIENT)
Dept: OBGYN | Facility: CLINIC | Age: 70
End: 2025-05-22
Payer: MEDICARE

## 2025-05-22 ENCOUNTER — NON-APPOINTMENT (OUTPATIENT)
Age: 70
End: 2025-05-22

## 2025-05-22 VITALS
BODY MASS INDEX: 22.82 KG/M2 | WEIGHT: 142 LBS | DIASTOLIC BLOOD PRESSURE: 62 MMHG | HEIGHT: 66 IN | SYSTOLIC BLOOD PRESSURE: 96 MMHG

## 2025-05-22 DIAGNOSIS — Z01.419 ENCOUNTER FOR GYNECOLOGICAL EXAMINATION (GENERAL) (ROUTINE) W/OUT ABNORMAL FINDINGS: ICD-10-CM

## 2025-05-22 PROCEDURE — G0101: CPT

## 2025-05-26 LAB
BACTERIA UR CULT: NORMAL
HPV HIGH+LOW RISK DNA PNL CVX: NOT DETECTED

## 2025-05-28 ENCOUNTER — TRANSCRIPTION ENCOUNTER (OUTPATIENT)
Age: 70
End: 2025-05-28

## 2025-05-28 LAB — CYTOLOGY CVX/VAG DOC THIN PREP: NORMAL

## 2025-06-23 ENCOUNTER — APPOINTMENT (OUTPATIENT)
Dept: ULTRASOUND IMAGING | Facility: CLINIC | Age: 70
End: 2025-06-23
Payer: MEDICARE

## 2025-06-23 ENCOUNTER — APPOINTMENT (OUTPATIENT)
Dept: RADIOLOGY | Facility: CLINIC | Age: 70
End: 2025-06-23
Payer: MEDICARE

## 2025-06-23 ENCOUNTER — APPOINTMENT (OUTPATIENT)
Dept: MAMMOGRAPHY | Facility: CLINIC | Age: 70
End: 2025-06-23
Payer: MEDICARE

## 2025-06-23 ENCOUNTER — RESULT REVIEW (OUTPATIENT)
Age: 70
End: 2025-06-23

## 2025-06-23 ENCOUNTER — OUTPATIENT (OUTPATIENT)
Dept: OUTPATIENT SERVICES | Facility: HOSPITAL | Age: 70
LOS: 1 days | End: 2025-06-23
Payer: MEDICARE

## 2025-06-23 DIAGNOSIS — Z01.419 ENCOUNTER FOR GYNECOLOGICAL EXAMINATION (GENERAL) (ROUTINE) WITHOUT ABNORMAL FINDINGS: ICD-10-CM

## 2025-06-23 PROCEDURE — 77080 DXA BONE DENSITY AXIAL: CPT | Mod: 26

## 2025-06-23 PROCEDURE — 77063 BREAST TOMOSYNTHESIS BI: CPT

## 2025-06-23 PROCEDURE — 76641 ULTRASOUND BREAST COMPLETE: CPT | Mod: 26,50,GA

## 2025-06-23 PROCEDURE — 76641 ULTRASOUND BREAST COMPLETE: CPT

## 2025-06-23 PROCEDURE — 77067 SCR MAMMO BI INCL CAD: CPT | Mod: 26

## 2025-06-23 PROCEDURE — 77063 BREAST TOMOSYNTHESIS BI: CPT | Mod: 26

## 2025-06-23 PROCEDURE — 77067 SCR MAMMO BI INCL CAD: CPT

## 2025-06-23 PROCEDURE — 77080 DXA BONE DENSITY AXIAL: CPT

## 2025-06-26 ENCOUNTER — TRANSCRIPTION ENCOUNTER (OUTPATIENT)
Age: 70
End: 2025-06-26

## 2025-06-27 ENCOUNTER — OFFICE (OUTPATIENT)
Dept: URBAN - METROPOLITAN AREA CLINIC 12 | Facility: CLINIC | Age: 70
Setting detail: OPHTHALMOLOGY
End: 2025-06-27
Payer: MEDICARE

## 2025-06-27 DIAGNOSIS — H01.001: ICD-10-CM

## 2025-06-27 DIAGNOSIS — H44.23: ICD-10-CM

## 2025-06-27 DIAGNOSIS — H35.413: ICD-10-CM

## 2025-06-27 DIAGNOSIS — H16.223: ICD-10-CM

## 2025-06-27 DIAGNOSIS — H01.004: ICD-10-CM

## 2025-06-27 DIAGNOSIS — H35.40: ICD-10-CM

## 2025-06-27 DIAGNOSIS — H43.393: ICD-10-CM

## 2025-06-27 PROCEDURE — 92014 COMPRE OPH EXAM EST PT 1/>: CPT | Performed by: OPHTHALMOLOGY

## 2025-06-27 PROCEDURE — 92250 FUNDUS PHOTOGRAPHY W/I&R: CPT | Performed by: OPHTHALMOLOGY

## 2025-06-27 ASSESSMENT — REFRACTION_AUTOREFRACTION
OS_CYLINDER: -1.75
OS_SPHERE: +0.50
OD_SPHERE: -0.25
OS_AXIS: 091
OD_AXIS: 073
OD_CYLINDER: -0.50

## 2025-06-27 ASSESSMENT — REFRACTION_CURRENTRX
OD_VPRISM_DIRECTION: SV
OS_SPHERE: +2.00
OD_OVR_VA: 20/
OS_VPRISM_DIRECTION: SV
OD_SPHERE: +2.00
OS_OVR_VA: 20/

## 2025-06-27 ASSESSMENT — CONFRONTATIONAL VISUAL FIELD TEST (CVF)
OS_FINDINGS: FULL
OD_FINDINGS: FULL

## 2025-06-27 ASSESSMENT — REFRACTION_MANIFEST
OD_VA1: 20/20
OS_AXIS: 090
OS_SPHERE: +0.50
OS_VA1: 20/20-3
OD_CYLINDER: -1.00
OD_AXIS: 075
OD_CYLINDER: -0.50
OS_SPHERE: +0.50
OD_VA1: 20/20-3
OS_CYLINDER: -1.50
OD_AXIS: 085
OD_SPHERE: -0.25
OS_CYLINDER: -1.75
OS_AXIS: 095
OD_SPHERE: PLANO
OS_VA1: 20/20-2
OU_VA: 20/25

## 2025-06-27 ASSESSMENT — TONOMETRY
OS_IOP_MMHG: 16
OD_IOP_MMHG: 16

## 2025-06-27 ASSESSMENT — SUPERFICIAL PUNCTATE KERATITIS (SPK)
OD_SPK: 1+
OS_SPK: 1+

## 2025-06-27 ASSESSMENT — KERATOMETRY
OS_K1POWER_DIOPTERS: 42.00
OD_K2POWER_DIOPTERS: 43.00
OD_AXISANGLE_DEGREES: 144
OD_K1POWER_DIOPTERS: 42.50
OS_AXISANGLE_DEGREES: 180
OS_K2POWER_DIOPTERS: 43.25

## 2025-06-27 ASSESSMENT — VISUAL ACUITY
OD_BCVA: 20/25-
OS_BCVA: 20/30

## 2025-06-27 ASSESSMENT — LID EXAM ASSESSMENTS
OD_BLEPHARITIS: RLL 1+
OS_BLEPHARITIS: LLL 1+

## 2025-08-28 ENCOUNTER — NON-APPOINTMENT (OUTPATIENT)
Age: 70
End: 2025-08-28

## 2025-08-29 ENCOUNTER — NON-APPOINTMENT (OUTPATIENT)
Age: 70
End: 2025-08-29

## 2025-09-08 ENCOUNTER — RX RENEWAL (OUTPATIENT)
Age: 70
End: 2025-09-08